# Patient Record
Sex: FEMALE | Race: WHITE | NOT HISPANIC OR LATINO | Employment: FULL TIME | URBAN - METROPOLITAN AREA
[De-identification: names, ages, dates, MRNs, and addresses within clinical notes are randomized per-mention and may not be internally consistent; named-entity substitution may affect disease eponyms.]

---

## 2017-01-12 ENCOUNTER — COMMUNICATION - HEALTHEAST (OUTPATIENT)
Dept: FAMILY MEDICINE | Facility: CLINIC | Age: 42
End: 2017-01-12

## 2017-01-19 ENCOUNTER — COMMUNICATION - HEALTHEAST (OUTPATIENT)
Dept: FAMILY MEDICINE | Facility: CLINIC | Age: 42
End: 2017-01-19

## 2017-03-05 ENCOUNTER — RECORDS - HEALTHEAST (OUTPATIENT)
Dept: ADMINISTRATIVE | Facility: OTHER | Age: 42
End: 2017-03-05

## 2017-03-27 ENCOUNTER — RECORDS - HEALTHEAST (OUTPATIENT)
Dept: ADMINISTRATIVE | Facility: OTHER | Age: 42
End: 2017-03-27

## 2017-05-11 ENCOUNTER — OFFICE VISIT - HEALTHEAST (OUTPATIENT)
Dept: PODIATRY | Facility: CLINIC | Age: 42
End: 2017-05-11

## 2017-05-11 DIAGNOSIS — L60.0 INGROWN TOENAIL: ICD-10-CM

## 2017-05-11 ASSESSMENT — MIFFLIN-ST. JEOR: SCORE: 1246.22

## 2017-05-24 ENCOUNTER — COMMUNICATION - HEALTHEAST (OUTPATIENT)
Dept: ADMINISTRATIVE | Facility: CLINIC | Age: 42
End: 2017-05-24

## 2017-05-25 ENCOUNTER — OFFICE VISIT - HEALTHEAST (OUTPATIENT)
Dept: PODIATRY | Facility: CLINIC | Age: 42
End: 2017-05-25

## 2017-05-25 DIAGNOSIS — L03.031 PARONYCHIA OF GREAT TOE OF RIGHT FOOT: ICD-10-CM

## 2017-05-25 DIAGNOSIS — D22.9: ICD-10-CM

## 2017-06-01 ENCOUNTER — OFFICE VISIT - HEALTHEAST (OUTPATIENT)
Dept: PODIATRY | Facility: CLINIC | Age: 42
End: 2017-06-01

## 2017-06-01 DIAGNOSIS — L03.031 PARONYCHIA OF GREAT TOE OF RIGHT FOOT: ICD-10-CM

## 2017-06-05 LAB
LAB AP CHARGES (HE HISTORICAL CONVERSION): NORMAL
PATH REPORT.COMMENTS IMP SPEC: NORMAL
PATH REPORT.FINAL DX SPEC: NORMAL
PATH REPORT.GROSS SPEC: NORMAL
PATH REPORT.MICROSCOPIC SPEC OTHER STN: NORMAL
PATH REPORT.RELEVANT HX SPEC: NORMAL
RESULT FLAG (HE HISTORICAL CONVERSION): NORMAL

## 2017-06-08 ENCOUNTER — OFFICE VISIT - HEALTHEAST (OUTPATIENT)
Dept: PODIATRY | Facility: CLINIC | Age: 42
End: 2017-06-08

## 2017-06-08 DIAGNOSIS — L03.031 PARONYCHIA OF GREAT TOE, RIGHT: ICD-10-CM

## 2017-06-19 ENCOUNTER — COMMUNICATION - HEALTHEAST (OUTPATIENT)
Dept: FAMILY MEDICINE | Facility: CLINIC | Age: 42
End: 2017-06-19

## 2017-06-19 DIAGNOSIS — Z00.00 ROUTINE GENERAL MEDICAL EXAMINATION AT A HEALTH CARE FACILITY: ICD-10-CM

## 2017-06-20 RX ORDER — SULFACETAMIDE SODIUM 100 MG/ML
LOTION TOPICAL
Qty: 118 ML | Refills: 12 | Status: SHIPPED | OUTPATIENT
Start: 2017-06-20

## 2017-09-14 ENCOUNTER — OFFICE VISIT - HEALTHEAST (OUTPATIENT)
Dept: FAMILY MEDICINE | Facility: CLINIC | Age: 42
End: 2017-09-14

## 2017-09-14 DIAGNOSIS — R53.83 FATIGUE: ICD-10-CM

## 2017-09-14 DIAGNOSIS — Z00.00 ROUTINE GENERAL MEDICAL EXAMINATION AT A HEALTH CARE FACILITY: ICD-10-CM

## 2017-09-14 LAB
CHOLEST SERPL-MCNC: 215 MG/DL
FASTING STATUS PATIENT QL REPORTED: YES
HDLC SERPL-MCNC: 67 MG/DL
LDLC SERPL CALC-MCNC: 136 MG/DL
TRIGL SERPL-MCNC: 60 MG/DL

## 2017-09-14 ASSESSMENT — MIFFLIN-ST. JEOR: SCORE: 1254.16

## 2017-09-15 ENCOUNTER — COMMUNICATION - HEALTHEAST (OUTPATIENT)
Dept: FAMILY MEDICINE | Facility: CLINIC | Age: 42
End: 2017-09-15

## 2018-01-11 ENCOUNTER — COMMUNICATION - HEALTHEAST (OUTPATIENT)
Dept: FAMILY MEDICINE | Facility: CLINIC | Age: 43
End: 2018-01-11

## 2018-03-16 ENCOUNTER — COMMUNICATION - HEALTHEAST (OUTPATIENT)
Dept: FAMILY MEDICINE | Facility: CLINIC | Age: 43
End: 2018-03-16

## 2018-03-20 ENCOUNTER — RECORDS - HEALTHEAST (OUTPATIENT)
Dept: ADMINISTRATIVE | Facility: OTHER | Age: 43
End: 2018-03-20

## 2018-07-19 ENCOUNTER — OFFICE VISIT - HEALTHEAST (OUTPATIENT)
Dept: FAMILY MEDICINE | Facility: CLINIC | Age: 43
End: 2018-07-19

## 2018-07-19 DIAGNOSIS — Z00.00 ROUTINE GENERAL MEDICAL EXAMINATION AT A HEALTH CARE FACILITY: ICD-10-CM

## 2018-07-19 DIAGNOSIS — J45.990 EXERCISE-INDUCED ASTHMA: ICD-10-CM

## 2018-07-19 DIAGNOSIS — E78.00 HYPERCHOLESTEREMIA: ICD-10-CM

## 2018-07-19 LAB
CHOLEST SERPL-MCNC: 230 MG/DL
FASTING STATUS PATIENT QL REPORTED: YES
HDLC SERPL-MCNC: 61 MG/DL
LDLC SERPL CALC-MCNC: 157 MG/DL
TRIGL SERPL-MCNC: 62 MG/DL

## 2018-07-19 ASSESSMENT — MIFFLIN-ST. JEOR: SCORE: 1255.29

## 2018-07-22 ENCOUNTER — OFFICE VISIT - HEALTHEAST (OUTPATIENT)
Dept: FAMILY MEDICINE | Facility: CLINIC | Age: 43
End: 2018-07-22

## 2018-07-22 DIAGNOSIS — B85.2 LICE: ICD-10-CM

## 2018-07-23 ENCOUNTER — COMMUNICATION - HEALTHEAST (OUTPATIENT)
Dept: FAMILY MEDICINE | Facility: CLINIC | Age: 43
End: 2018-07-23

## 2018-11-07 ENCOUNTER — COMMUNICATION - HEALTHEAST (OUTPATIENT)
Dept: FAMILY MEDICINE | Facility: CLINIC | Age: 43
End: 2018-11-07

## 2018-11-07 DIAGNOSIS — M25.519 SHOULDER PAIN: ICD-10-CM

## 2018-11-29 ENCOUNTER — OFFICE VISIT - HEALTHEAST (OUTPATIENT)
Dept: FAMILY MEDICINE | Facility: CLINIC | Age: 43
End: 2018-11-29

## 2018-11-29 ENCOUNTER — OFFICE VISIT - HEALTHEAST (OUTPATIENT)
Dept: PHYSICAL THERAPY | Facility: REHABILITATION | Age: 43
End: 2018-11-29

## 2018-11-29 DIAGNOSIS — M25.511 CHRONIC RIGHT SHOULDER PAIN: ICD-10-CM

## 2018-11-29 DIAGNOSIS — M62.81 GENERALIZED MUSCLE WEAKNESS: ICD-10-CM

## 2018-11-29 DIAGNOSIS — G89.29 CHRONIC RIGHT SHOULDER PAIN: ICD-10-CM

## 2018-11-29 DIAGNOSIS — Z01.818 PRE-OPERATIVE EXAMINATION: ICD-10-CM

## 2018-11-29 DIAGNOSIS — G25.89 SCAPULAR DYSKINESIS: ICD-10-CM

## 2018-11-29 DIAGNOSIS — E72.10 DISORDER OF SULFUR-BEARING AMINO ACID METABOLISM (H): ICD-10-CM

## 2018-11-29 DIAGNOSIS — R29.3 POOR POSTURE: ICD-10-CM

## 2018-11-29 LAB
HCG UR QL: NEGATIVE
HGB BLD-MCNC: 13.1 G/DL (ref 12–16)
SP GR UR STRIP: 1.01 (ref 1–1.03)

## 2018-11-29 ASSESSMENT — MIFFLIN-ST. JEOR: SCORE: 1255.29

## 2019-01-03 ENCOUNTER — OFFICE VISIT - HEALTHEAST (OUTPATIENT)
Dept: PHYSICAL THERAPY | Facility: REHABILITATION | Age: 44
End: 2019-01-03

## 2019-01-03 DIAGNOSIS — M62.81 GENERALIZED MUSCLE WEAKNESS: ICD-10-CM

## 2019-01-03 DIAGNOSIS — R29.3 POOR POSTURE: ICD-10-CM

## 2019-01-03 DIAGNOSIS — M25.511 CHRONIC RIGHT SHOULDER PAIN: ICD-10-CM

## 2019-01-03 DIAGNOSIS — G89.29 CHRONIC RIGHT SHOULDER PAIN: ICD-10-CM

## 2019-01-03 DIAGNOSIS — G25.89 SCAPULAR DYSKINESIS: ICD-10-CM

## 2019-02-14 ENCOUNTER — OFFICE VISIT - HEALTHEAST (OUTPATIENT)
Dept: PHYSICAL THERAPY | Facility: REHABILITATION | Age: 44
End: 2019-02-14

## 2019-02-14 DIAGNOSIS — R29.3 POOR POSTURE: ICD-10-CM

## 2019-02-14 DIAGNOSIS — M25.511 CHRONIC RIGHT SHOULDER PAIN: ICD-10-CM

## 2019-02-14 DIAGNOSIS — M62.81 GENERALIZED MUSCLE WEAKNESS: ICD-10-CM

## 2019-02-14 DIAGNOSIS — G25.89 SCAPULAR DYSKINESIS: ICD-10-CM

## 2019-02-14 DIAGNOSIS — G89.29 CHRONIC RIGHT SHOULDER PAIN: ICD-10-CM

## 2019-03-14 ENCOUNTER — OFFICE VISIT - HEALTHEAST (OUTPATIENT)
Dept: PHYSICAL THERAPY | Facility: REHABILITATION | Age: 44
End: 2019-03-14

## 2019-03-14 DIAGNOSIS — R29.3 POOR POSTURE: ICD-10-CM

## 2019-03-14 DIAGNOSIS — M62.81 GENERALIZED MUSCLE WEAKNESS: ICD-10-CM

## 2019-03-14 DIAGNOSIS — M25.511 CHRONIC RIGHT SHOULDER PAIN: ICD-10-CM

## 2019-03-14 DIAGNOSIS — G89.29 CHRONIC RIGHT SHOULDER PAIN: ICD-10-CM

## 2019-03-14 DIAGNOSIS — G25.89 SCAPULAR DYSKINESIS: ICD-10-CM

## 2019-03-26 ENCOUNTER — COMMUNICATION - HEALTHEAST (OUTPATIENT)
Dept: FAMILY MEDICINE | Facility: CLINIC | Age: 44
End: 2019-03-26

## 2019-04-01 ENCOUNTER — COMMUNICATION - HEALTHEAST (OUTPATIENT)
Dept: FAMILY MEDICINE | Facility: CLINIC | Age: 44
End: 2019-04-01

## 2019-05-02 ENCOUNTER — OFFICE VISIT - HEALTHEAST (OUTPATIENT)
Dept: FAMILY MEDICINE | Facility: CLINIC | Age: 44
End: 2019-05-02

## 2019-05-02 DIAGNOSIS — R10.13 ABDOMINAL DISCOMFORT, EPIGASTRIC: ICD-10-CM

## 2019-05-02 DIAGNOSIS — Z12.4 SCREENING FOR CERVICAL CANCER: ICD-10-CM

## 2019-05-02 DIAGNOSIS — R11.0 NAUSEA: ICD-10-CM

## 2019-05-02 LAB
ALBUMIN SERPL-MCNC: 4.3 G/DL (ref 3.5–5)
ALP SERPL-CCNC: 45 U/L (ref 45–120)
ALT SERPL W P-5'-P-CCNC: 20 U/L (ref 0–45)
AMYLASE SERPL-CCNC: 50 U/L (ref 5–120)
ANION GAP SERPL CALCULATED.3IONS-SCNC: 11 MMOL/L (ref 5–18)
AST SERPL W P-5'-P-CCNC: 27 U/L (ref 0–40)
BASOPHILS # BLD AUTO: 0 THOU/UL (ref 0–0.2)
BASOPHILS NFR BLD AUTO: 0 % (ref 0–2)
BILIRUB SERPL-MCNC: 0.3 MG/DL (ref 0–1)
BUN SERPL-MCNC: 17 MG/DL (ref 8–22)
CALCIUM SERPL-MCNC: 10 MG/DL (ref 8.5–10.5)
CHLORIDE BLD-SCNC: 106 MMOL/L (ref 98–107)
CO2 SERPL-SCNC: 23 MMOL/L (ref 22–31)
CREAT SERPL-MCNC: 0.86 MG/DL (ref 0.6–1.1)
EOSINOPHIL # BLD AUTO: 0.1 THOU/UL (ref 0–0.4)
EOSINOPHIL NFR BLD AUTO: 1 % (ref 0–6)
ERYTHROCYTE [DISTWIDTH] IN BLOOD BY AUTOMATED COUNT: 10.8 % (ref 11–14.5)
GFR SERPL CREATININE-BSD FRML MDRD: >60 ML/MIN/1.73M2
GLUCOSE BLD-MCNC: 103 MG/DL (ref 70–125)
HCT VFR BLD AUTO: 38.1 % (ref 35–47)
HGB BLD-MCNC: 12.8 G/DL (ref 12–16)
LIPASE SERPL-CCNC: 88 U/L (ref 0–52)
LYMPHOCYTES # BLD AUTO: 2.2 THOU/UL (ref 0.8–4.4)
LYMPHOCYTES NFR BLD AUTO: 35 % (ref 20–40)
MCH RBC QN AUTO: 29.7 PG (ref 27–34)
MCHC RBC AUTO-ENTMCNC: 33.7 G/DL (ref 32–36)
MCV RBC AUTO: 88 FL (ref 80–100)
MONOCYTES # BLD AUTO: 0.6 THOU/UL (ref 0–0.9)
MONOCYTES NFR BLD AUTO: 10 % (ref 2–10)
NEUTROPHILS # BLD AUTO: 3.3 THOU/UL (ref 2–7.7)
NEUTROPHILS NFR BLD AUTO: 53 % (ref 50–70)
PLATELET # BLD AUTO: 258 THOU/UL (ref 140–440)
PMV BLD AUTO: 8.5 FL (ref 7–10)
POTASSIUM BLD-SCNC: 4 MMOL/L (ref 3.5–5)
PROT SERPL-MCNC: 7.2 G/DL (ref 6–8)
RBC # BLD AUTO: 4.31 MILL/UL (ref 3.8–5.4)
SODIUM SERPL-SCNC: 140 MMOL/L (ref 136–145)
WBC: 6.2 THOU/UL (ref 4–11)

## 2019-05-02 ASSESSMENT — MIFFLIN-ST. JEOR: SCORE: 1264.37

## 2019-05-03 ENCOUNTER — AMBULATORY - HEALTHEAST (OUTPATIENT)
Dept: FAMILY MEDICINE | Facility: CLINIC | Age: 44
End: 2019-05-03

## 2019-05-03 DIAGNOSIS — R74.8 ELEVATED LIPASE: ICD-10-CM

## 2019-05-03 LAB
HPV SOURCE: NORMAL
HUMAN PAPILLOMA VIRUS 16 DNA: NEGATIVE
HUMAN PAPILLOMA VIRUS 18 DNA: NEGATIVE
HUMAN PAPILLOMA VIRUS FINAL DIAGNOSIS: NORMAL
HUMAN PAPILLOMA VIRUS OTHER HR: NEGATIVE
SPECIMEN DESCRIPTION: NORMAL

## 2019-05-09 ENCOUNTER — AMBULATORY - HEALTHEAST (OUTPATIENT)
Dept: LAB | Facility: CLINIC | Age: 44
End: 2019-05-09

## 2019-05-09 DIAGNOSIS — R74.8 ELEVATED LIPASE: ICD-10-CM

## 2019-05-09 LAB
ALBUMIN UR-MCNC: NEGATIVE MG/DL
APPEARANCE UR: CLEAR
BACTERIA #/AREA URNS HPF: ABNORMAL HPF
BILIRUB UR QL STRIP: NEGATIVE
BKR LAB AP ABNORMAL BLEEDING: NO
BKR LAB AP BIRTH CONTROL/HORMONES: NORMAL
BKR LAB AP CERVICAL APPEARANCE: NORMAL
BKR LAB AP GYN ADEQUACY: NORMAL
BKR LAB AP GYN INTERPRETATION: NORMAL
BKR LAB AP HPV REFLEX: NORMAL
BKR LAB AP LMP: NORMAL
BKR LAB AP PATIENT STATUS: NORMAL
BKR LAB AP PREVIOUS ABNORMAL: NORMAL
BKR LAB AP PREVIOUS NORMAL: 2013
COLOR UR AUTO: YELLOW
GLUCOSE UR STRIP-MCNC: NEGATIVE MG/DL
HGB UR QL STRIP: ABNORMAL
HIGH RISK?: NO
KETONES UR STRIP-MCNC: NEGATIVE MG/DL
LEUKOCYTE ESTERASE UR QL STRIP: NEGATIVE
LIPASE SERPL-CCNC: 62 U/L (ref 0–52)
NITRATE UR QL: NEGATIVE
PATH REPORT.COMMENTS IMP SPEC: NORMAL
PH UR STRIP: 5.5 [PH] (ref 5–8)
RBC #/AREA URNS AUTO: ABNORMAL HPF
RESULT FLAG (HE HISTORICAL CONVERSION): NORMAL
SP GR UR STRIP: <=1.005 (ref 1–1.03)
SQUAMOUS #/AREA URNS AUTO: ABNORMAL LPF
UROBILINOGEN UR STRIP-ACNC: ABNORMAL
WBC #/AREA URNS AUTO: ABNORMAL HPF

## 2019-05-10 LAB
H PYLORI AG STL QL IA: NORMAL
REPORT STATUS: NORMAL
SPECIMEN DESCRIPTION: NORMAL

## 2019-05-17 ENCOUNTER — COMMUNICATION - HEALTHEAST (OUTPATIENT)
Dept: FAMILY MEDICINE | Facility: CLINIC | Age: 44
End: 2019-05-17

## 2019-05-17 ENCOUNTER — AMBULATORY - HEALTHEAST (OUTPATIENT)
Dept: FAMILY MEDICINE | Facility: CLINIC | Age: 44
End: 2019-05-17

## 2019-05-17 DIAGNOSIS — R74.8 ELEVATED LIPASE: ICD-10-CM

## 2019-05-20 ENCOUNTER — AMBULATORY - HEALTHEAST (OUTPATIENT)
Dept: FAMILY MEDICINE | Facility: CLINIC | Age: 44
End: 2019-05-20

## 2019-05-20 DIAGNOSIS — R74.8 ELEVATED LIPASE: ICD-10-CM

## 2019-05-23 ENCOUNTER — COMMUNICATION - HEALTHEAST (OUTPATIENT)
Dept: FAMILY MEDICINE | Facility: CLINIC | Age: 44
End: 2019-05-23

## 2019-05-23 ENCOUNTER — HOSPITAL ENCOUNTER (OUTPATIENT)
Dept: CT IMAGING | Facility: HOSPITAL | Age: 44
Discharge: HOME | End: 2019-05-23
Attending: FAMILY MEDICINE

## 2019-05-23 DIAGNOSIS — R74.8 ELEVATED LIPASE: ICD-10-CM

## 2019-06-20 ENCOUNTER — RECORDS - HEALTHEAST (OUTPATIENT)
Dept: ADMINISTRATIVE | Facility: OTHER | Age: 44
End: 2019-06-20

## 2019-09-12 ENCOUNTER — RECORDS - HEALTHEAST (OUTPATIENT)
Dept: ADMINISTRATIVE | Facility: OTHER | Age: 44
End: 2019-09-12

## 2019-10-24 ENCOUNTER — RECORDS - HEALTHEAST (OUTPATIENT)
Dept: ADMINISTRATIVE | Facility: OTHER | Age: 44
End: 2019-10-24

## 2019-10-29 ENCOUNTER — MEDICAL CORRESPONDENCE (OUTPATIENT)
Dept: HEALTH INFORMATION MANAGEMENT | Facility: CLINIC | Age: 44
End: 2019-10-29

## 2019-10-29 ENCOUNTER — TRANSFERRED RECORDS (OUTPATIENT)
Dept: HEALTH INFORMATION MANAGEMENT | Facility: CLINIC | Age: 44
End: 2019-10-29

## 2019-11-14 ENCOUNTER — COMMUNICATION - HEALTHEAST (OUTPATIENT)
Dept: FAMILY MEDICINE | Facility: CLINIC | Age: 44
End: 2019-11-14

## 2019-11-14 ENCOUNTER — OFFICE VISIT - HEALTHEAST (OUTPATIENT)
Dept: FAMILY MEDICINE | Facility: CLINIC | Age: 44
End: 2019-11-14

## 2019-11-14 DIAGNOSIS — R68.2 DRY MOUTH: ICD-10-CM

## 2019-11-14 DIAGNOSIS — I83.93 ASYMPTOMATIC VARICOSE VEINS OF BOTH LOWER EXTREMITIES: ICD-10-CM

## 2019-11-14 DIAGNOSIS — R74.8 ELEVATED LIPASE: ICD-10-CM

## 2019-11-14 DIAGNOSIS — Z00.00 HEALTHY ADULT ON ROUTINE PHYSICAL EXAMINATION: ICD-10-CM

## 2019-11-14 DIAGNOSIS — D36.13 NEUROMA OF FOOT: ICD-10-CM

## 2019-11-14 DIAGNOSIS — Z00.00 ROUTINE GENERAL MEDICAL EXAMINATION AT A HEALTH CARE FACILITY: ICD-10-CM

## 2019-11-14 DIAGNOSIS — H04.123 DRY EYES: ICD-10-CM

## 2019-11-14 LAB
C REACTIVE PROTEIN LHE: <0.1 MG/DL (ref 0–0.8)
ERYTHROCYTE [SEDIMENTATION RATE] IN BLOOD BY WESTERGREN METHOD: 7 MM/HR (ref 0–20)
LIPASE SERPL-CCNC: 56 U/L (ref 0–52)

## 2019-11-14 RX ORDER — LIFITEGRAST 50 MG/ML
SOLUTION/ DROPS OPHTHALMIC
Status: SHIPPED | COMMUNITY
Start: 2019-11-13

## 2019-11-14 ASSESSMENT — MIFFLIN-ST. JEOR: SCORE: 1269.47

## 2019-11-22 ENCOUNTER — COMMUNICATION - HEALTHEAST (OUTPATIENT)
Dept: FAMILY MEDICINE | Facility: CLINIC | Age: 44
End: 2019-11-22

## 2020-01-09 ENCOUNTER — COMMUNICATION - HEALTHEAST (OUTPATIENT)
Dept: FAMILY MEDICINE | Facility: CLINIC | Age: 45
End: 2020-01-09

## 2020-01-09 DIAGNOSIS — J45.990 EXERCISE-INDUCED ASTHMA: ICD-10-CM

## 2020-01-13 RX ORDER — ALBUTEROL SULFATE 90 UG/1
AEROSOL, METERED RESPIRATORY (INHALATION)
Qty: 18 G | Refills: 3 | Status: SHIPPED | OUTPATIENT
Start: 2020-01-13 | End: 2021-09-16

## 2021-05-27 NOTE — TELEPHONE ENCOUNTER
New Appointment Needed  What is the reason for the visit:    patient says she feels sick to her stomach  Provider Preference: PCP only  How soon do you need to be seen?: next week, patient only has availability on Thursdays  Waitlist offered?: No  Okay to leave a detailed message:  Yes

## 2021-05-27 NOTE — TELEPHONE ENCOUNTER
Called pt and relayed PCP is out all week and would need to ask PCP if can fit in on Thursday.  Suggested [facility] at [facility].  Pt wasn't too keen due to the wait time.  Checked out both [facility] and [facility] to be seen.  Pt to keep checking.  Pt has been nauseated x 2 weeks, no vomiting or diarrhea.  Thanks.

## 2021-05-27 NOTE — TELEPHONE ENCOUNTER
Called patient and left voicemail.  If patient calls back please schedule for asthma and general appointment with Dr. Joseph.

## 2021-05-28 NOTE — TELEPHONE ENCOUNTER
Patient Returning Call  Reason for call:  Caller returning call to check on the status of this request.  Information relayed to patient:  Pending providers fedback.  Patient has additional questions:  yes  If YES, what are your questions/concerns:  Please return call to patient soon she stated the abdominal pain is not worse but about the same.  Patient was advised Ayala Joseph MD is not in office today or tomorrow.   Okay to leave a detailed message?: Yes

## 2021-05-28 NOTE — PROGRESS NOTES
"S:  45 yo female who is here with complaints of abdominal discomfort, and some mild bloating that has been going on for 1 month.  It is intermittent.  It is only in her upper abdomen.    She is also seeing an acupuncturist, and was given a new formula, this has since been changed back to the original formula, and her nausea is improved.    She is on supplements through her acupuncture provider.    She feels more constipated than normal.  No changes in her bladder function.  No black or bloody stools. She still has a BM nearly daily.  She feels like her weight has gone up.  She is running a lot and is working out a lot.  Her clothes are fitting her mostly the same. No early satiety.  No increase in belching.    She has ibuprofen once weekly before playing volleyball.  She has more nausea after she has more tea or ibuprofen.      fam hx:  Of colon cancer in her aunt and her father has had polyps.      Meds:  Chaste berry plus by [employer]        IC3+ by nutridyn    Soc hx:  She has tried a lot of different diet strategies.   She has been trying intermittent fasting.  She will have tea with protein in the middle of the day.      O:  /62   Pulse 62   Temp 98.4  F (36.9  C) (Oral)   Resp 16   Ht 5' 6.5\" (1.689 m)   Wt 130 lb (59 kg)   LMP 04/06/2019 (Approximate)   SpO2 98%   BMI 20.67 kg/m    Gen: no acute distress  Neck:  Supple, no lad, no thyromegaly or nodules.   Heart:  Regular rate and rhythm.  No m/r/g  Lungs: cta bilaterally, no wheezes or rhonchi.  Good air inspiration  Abdomen:  No masses or organomegaly, soft.  Non tender.  Non distended.  Normal bowel sounds.  No rebound or guarding.   Extremities:  No edema.     Genitourinary Exam:   Vulva: normal skin.  No lesions noted.  Nontender.    Urethral meatus: normal size and location, no lesions or discharge   Urethra: no tenderness or masses   Bladder: no fullness or tenderness   Vagina: normal appearance, physiologic discharge. No evidence of " cystocele or rectocele.   Cervix: normal appearance, no lesions, no cervical motion tenderness   Uterus: normal size and position, mobile, non-tender   Pap smear obtained: yes  Rectal exam: deferred             Patient Active Problem List   Diagnosis     Chronic Tension-type Headache     Dry Eye Syndrome     Fatigue     Varicose Veins     Irregular Length Of Menstrual Periods     Cough     Methylenetetrahydrofolate Reductase Deficiency     Acne     Melasma     Shoulder pain     Current Outpatient Medications on File Prior to Visit   Medication Sig Dispense Refill     albuterol (PROAIR HFA;PROVENTIL HFA;VENTOLIN HFA) 90 mcg/actuation inhaler Inhale 2 puffs every 6 (six) hours as needed for wheezing. 18 g 11     aspirin 81 MG EC tablet Take 1 tablet (81 mg total) by mouth daily.  0     b complex vitamins tablet Take 1 tablet by mouth daily.       chlorpheniramine (CHLOR-TRIMETON) 4 mg tablet Take 1 mg by mouth every 6 (six) hours as needed for allergies.       ivermectin (STROMECTOL) 3 mg Tab Take 4 tablets orally every 10 days for 2 doses. 8 tablet 0     MAGNESIUM CARBONATE ORAL Take 1 capsule by mouth daily.       PRASTERONE, DHEA, (DHEA ORAL) Take 1 capsule by mouth daily.       sulfacetamide sodium 10 % Susp Apply  Topically twice daily 118 mL 12     turmeric root extract 500 mg cap Take 1 capsule by mouth daily.       UNABLE TO FIND Take 2 capsules by mouth daily. Med Name: adren-all        UNABLE TO FIND Take 1 capsule by mouth at bedtime. Med Name: Thyromin made by [employer]       UNABLE TO FIND Med Name: progesterone/testosterone 100/0.5mg  Dissove 1/16 sublingual  6-13 of month take 1/16 daily; 14-28 of month take 1/16th of tab bid.       UNABLE TO FIND Take 1 tablet by mouth 2 (two) times a day. Med Name: Chasteberry Plus       UNABLE TO FIND Take 1 capsule by mouth 2 (two) times a day. Med Name: Nutridyn I-3-C Plus       UNABLE TO FIND Take 3 drops by mouth once. Med Name: K&B by [employer]       No current facility-administered medications on file prior to visit.           No results found for this or any previous visit (from the past 48 hour(s)).      Assessment/Plan:  1. Screening for cervical cancer    - Gynecologic Cytology (PAP Smear)    2. Abdominal discomfort, epigastric  Likely due to gastritis.  Will start using ranitidine twice daily.  We discussed diet changes.  If her symptoms persist or worsen she is to let me know, and at that point some imaging and an endoscopy would be warrante.d      - Urinalysis-UC if Indicated  - Comprehensive Metabolic Panel  - HM1(CBC and Differential)  - Lipase  - Amylase  - H. pylori Antigen, Stool(HPSAG)  - ranitidine (ZANTAC) 150 MG tablet; Take 1 tablet (150 mg total) by mouth 2 (two) times a day.  Dispense: 60 tablet; Refill: 1  - HM1 (CBC with Diff)    3. Nausea    - Urinalysis-UC if Indicated  - Comprehensive Metabolic Panel  - HM1(CBC and Differential)  - Lipase  - Amylase  - H. pylori Antigen, Stool(HPSAG)  - ranitidine (ZANTAC) 150 MG tablet; Take 1 tablet (150 mg total) by mouth 2 (two) times a day.  Dispense: 60 tablet; Refill: 1  - HM1 (CBC with Diff)          Ayala Joseph   5/2/2019 3:37 PM

## 2021-05-28 NOTE — TELEPHONE ENCOUNTER
Patient Returning Call  Reason for call:  The patient is returning the call.  Information relayed to patient:  Below message has been relayed to the patient.  The patient states that her nausea is better, the stomach bloating is not better. The patient states she does not have pain unless she presses on her stomach.       Notes recorded by Ayala Joseph MD on 5/17/2019 at 3:18 PM CDT  I called and left a message with the patient regarding these results.  I did ask her to give us a call back so that we could see how she was doing.  If she is has not having worsening abdominal pain then she will need a CT scan of her abdomen.  I have put in a referral to GI as well.       Patient has additional questions:  No  If YES, what are your questions/concerns:  NA  Okay to leave a detailed message?: No

## 2021-05-29 NOTE — TELEPHONE ENCOUNTER
Called pt and relayed covering provider's message.  Understood. Pt has apt with [facility] TOVAR and gave numbers for both  and [facility] for CT.  Thanks.

## 2021-05-29 NOTE — TELEPHONE ENCOUNTER
Please call the patient and let her know that I put in a referral for a CT scan of her abdomen as this is what Dr. Joseph had planned as the next step in evaluation.  Dr. Joseph has also put in a referral to [facility].  Please give the patient the phone number for radiology so she can call and make an appointment for the CT scan.

## 2021-05-30 VITALS — HEIGHT: 67 IN | WEIGHT: 126 LBS | BODY MASS INDEX: 19.78 KG/M2

## 2021-05-31 ENCOUNTER — RECORDS - HEALTHEAST (OUTPATIENT)
Dept: ADMINISTRATIVE | Facility: CLINIC | Age: 46
End: 2021-05-31

## 2021-05-31 VITALS — BODY MASS INDEX: 20.05 KG/M2 | WEIGHT: 127.75 LBS | HEIGHT: 67 IN

## 2021-06-01 VITALS — WEIGHT: 130.5 LBS | BODY MASS INDEX: 20.75 KG/M2

## 2021-06-01 VITALS — HEIGHT: 67 IN | BODY MASS INDEX: 20.09 KG/M2 | WEIGHT: 128 LBS

## 2021-06-02 VITALS — WEIGHT: 128 LBS | BODY MASS INDEX: 20.09 KG/M2 | HEIGHT: 67 IN

## 2021-06-03 VITALS — WEIGHT: 130 LBS | HEIGHT: 67 IN | BODY MASS INDEX: 20.4 KG/M2

## 2021-06-03 VITALS
TEMPERATURE: 97.7 F | HEIGHT: 66 IN | SYSTOLIC BLOOD PRESSURE: 82 MMHG | WEIGHT: 132 LBS | RESPIRATION RATE: 16 BRPM | OXYGEN SATURATION: 95 % | DIASTOLIC BLOOD PRESSURE: 58 MMHG | BODY MASS INDEX: 21.21 KG/M2 | HEART RATE: 53 BPM

## 2021-06-05 NOTE — TELEPHONE ENCOUNTER
Refill Approved    Rx renewed per Medication Renewal Policy. Medication was last renewed on 7/19/18 #18g R-11.    Last OV 11/14/19    Sowmya Rausch, [facility] Triage/Med Refill 1/13/2020     Requested Prescriptions   Pending Prescriptions Disp Refills     VENTOLIN HFA 90 mcg/actuation inhaler [Pharmacy Med Name: Ventolin HFA Inhalation Aerosol Solution 108 (90 Base) MCG/ACT] 18 g 10     Sig: INHALE TWO PUFFS BY MOUTH EVERY SIX HOURS AS NEEDED FOR WHEEZING       Albuterol/Levalbuterol Refill Protocol Passed - 1/9/2020 11:44 AM        Passed - PCP or prescribing provider visit in last year     Last office visit with prescriber/PCP: 5/2/2019 Ayala Joseph MD OR same dept: 5/2/2019 Ayala Joseph MD OR same specialty: 5/2/2019 Ayala Joseph MD Last physical: 11/14/2019       Next appt within 3 mo: Visit date not found  Next physical within 3 mo: Visit date not found  Prescriber OR PCP: Ayala Joseph MD  Last diagnosis associated with med order: 1. Exercise-induced asthma  - VENTOLIN HFA 90 mcg/actuation inhaler [Pharmacy Med Name: Ventolin HFA Inhalation Aerosol Solution 108 (90 Base) MCG/ACT]; INHALE TWO PUFFS BY MOUTH EVERY SIX HOURS AS NEEDED FOR WHEEZING  Dispense: 18 g; Refill: 10    If protocol passes may refill for 6 months if within 3 months of last provider visit (or a total of 9 months). If patient requesting >1 inhaler per month refill x 6 months and have patient make appointment with provider.

## 2021-06-10 NOTE — PROGRESS NOTES
Subjective findings: The patient return to the clinic once again today complaining of a painful right great toenail.  She has had some drainage redness and swelling along the out side portion of the right great toenail.    Objective findings: : Nails bilateral feet are normal length and color.  There is a slight erythema and edema at the level of the eponychiuml of the right great toenail.   Skin bilateral feet warm supple and intact.  DP and PT pulses +2/4 bilateral feet. Capillary refill less than 2 seconds bilateral feet.  Negative clonus, negative Babinski bilateral feet.  Range of motion within normal limits bilateral feet. Muscle power +5/5 bilaterally in all compartments.      Assessment: Paronychia right great toe, unknown mass nailbed right great toe      Plan: I performed a total nail evulsion of the right great toenail today under local anesthesia of 2% lidocaine plain.  Once the nail had been removed and area of the pigmentation was noted and intraoperative decision was made to biopsy this lesion.  Utilizing a 15 blade to semi-elliptical incisions were made encompassing this area of increased pigmentation.  This portion of the nail bed was placed in formalin to be sent to pathology.  The nailbed was repaired with 4-0 nylon suture material.  A sterile dressing was comprising of bacitracin, Telfa, 2 inch Ramiro and tube gauze.  The patient tolerated the procedure and anesthesia well and was discharged in good condition.  She was placed on Tylenol 3 1 tab every 4-6 hours as needed pain the patient was placed on clindamycin 300 mg 1 tab 3 times daily.  I have asked the patient to return to the clinic in 1 week  for follow-up visit.

## 2021-06-10 NOTE — PROGRESS NOTES
----- Message from Jina Petersen MD sent at 2/8/2021 12:15 PM CST -----  Labs look fairly normal as far as STD screen.  White count was elevated, like she may have been coming down with some infection, any symptoms?  Can follow up to discuss.   Admission History & Physical  Danya Brooks, 1975, [id number]    [facility]  Ayala Pao Joseph MD, [phone]    No emergency contact information on file.     Assessment and Plan:   Assessment: Onychauxis left great toenail  Plan: The patient is to monitor her condition.  If she develops redness, swelling, drainage or bleeding the patient is to return to the clinic for follow-up visit.  Active Problems:    * No active hospital problems. *      Chief Complaint:  infected left great toenail      HPI:    Danya Brooks is a 42 y.o. old female who presented to the clinic today complaining of an infected left great toenail.  She stated that she has been soaking and applying antibiotic and the infection has significantly improved.  She started to cancel her appointment but she decided to keep the appointment to have her toe evaluated.    History is provided by patient    Medical History  Active Ambulatory (Non-Hospital) Problems    Diagnosis     Shoulder pain     Acne     Melasma     Chronic Tension-type Headache     Dry Eye Syndrome     Fatigue     Varicose Veins     Irregular Length Of Menstrual Periods     Cough     Methylenetetrahydrofolate Reductase Deficiency     History reviewed. No pertinent past medical history.  Patient Active Problem List    Diagnosis Date Noted     Shoulder pain 01/12/2016     Acne      Melasma      Chronic Tension-type Headache      Dry Eye Syndrome      Fatigue      Varicose Veins      Irregular Length Of Menstrual Periods      Cough      Methylenetetrahydrofolate Reductase Deficiency      Surgical History  She  has a past surgical history that includes ligatn short saphen.   Past Surgical History:   Procedure Laterality Date     ID LIGATMICHELLE SHORT SAPHEN      Description: Venous Ligation Short Saphenous Vein;  Recorded: 10/04/2012;    Social History  Reviewed, and she  reports that she has never smoked. She does not have any smokeless tobacco history on file.  Social  "History   Substance Use Topics     Smoking status: Never Smoker     Smokeless tobacco: Not on file     Alcohol use Not on file      Allergies  Allergies   Allergen Reactions     Amoxicillin Hives     Doxycycline Hyclate Hives     Minocycline Hives    Family History  Reviewed, and family history includes Colon polyps in her father; Hyperlipidemia in her father; Ovarian cancer in her sister; Thyroid disease in her mother.   Psychosocial Needs  Social History     Social History Narrative     Additional psychosocial needs reviewed per nursing assessment.       Prior to Admission Medications     (Not in a hospital admission)        Review of Systems - Negative     /78  Pulse (!) 53  Ht 5' 6.5\" (1.689 m)  Wt 126 lb (57.2 kg)  SpO2 99%  BMI 20.03 kg/m2    Objective findings: General: The patient is alert and in no acute distress      Integument: Nails bilateral feet are normal length and color.  There is a slight discoloration of the left great toenail along the lateral border.  Skin bilateral feet warm supple and intact.      Vascular: DP and PT pulses +2/4 bilateral feet. Capillary refill less than 2 seconds bilateral feet.      Neurologic: Negative clonus, negative Babinski bilateral feet.      Musculoskeletal: Range of motion within normal limits bilateral feet. Muscle power +5/5 bilaterally in all compartments.      Assessment: onychauxis      Plan: I have recommended the patient continue to monitor her condition.  No treatment is needed at this particular time.   "

## 2021-06-11 NOTE — PROGRESS NOTES
Subjective findings: The patient return to the clinic today for postop visit #1, 1 week status post total nail evulsion with excision and biopsy of the nailbed right great toe.  The patient stated she had very little discomfort.  She was able to wear normal shoes this week.  She had minimal bleeding.    Objective findings: The dressings were removed and the wound margins are well coaptated and maintained.  There is no edema, erythema, cellulitis, drainage or bleeding noted.  Neurovascular status is intact.  Vital signs are stable.    Assessment: Paronychia right great toe.    Plan: I informed the patient that I have not received a report from the pathologist regarding the biopsy.  A Band-Aid was applied to the right great toe.  The patient is to return to the clinic in 1 week for postop visit #2 at which time sutures will be removed.

## 2021-06-11 NOTE — PROGRESS NOTES
Subjective findings: The patient return to the clinic today for postop visit #2, 2 weeks status post total nail evulsion with excision and biopsy of the nailbed right great toe.  The patient stated she had very little discomfort.      Objective findings: The dressings were removed and the wound margins are well healed.   There is no edema, erythema, cellulitis, drainage or bleeding noted.  Neurovascular status is intact.      Assessment: Paronychia right great toe.     Plan: I removed all sutures.  The patient was given a prescription for clindamycin 300 mg 1 tab 3 times daily.  The patient is to return to the clinic as needed.

## 2021-06-12 ENCOUNTER — HEALTH MAINTENANCE LETTER (OUTPATIENT)
Age: 46
End: 2021-06-12

## 2021-06-12 NOTE — PROGRESS NOTES
"Assessment:      Healthy female exam.    on oral testosterone:  Check lft's and lipids.  Advised that testosterone can increase the risk of blood clots, specifically dvt's.  I recommend that she discontinue oral testosterone.    Orders Placed This Encounter   Procedures     Lipid Cascade     Comprehensive Metabolic Panel     Thyroid Cascade     Plan for a pap smear next year.    She did have some questions about intermittent GI upset.  As well as her fatigue.  I did let her know that she could try a complete elimination diet as she noted that some foods do not seem to agree with her.  I reviewed how to do this.  I did give her some information regarding a full elimination diet such as one that is used to diagnose eosinophilic esophagitis.       Subjective:      Danya Brooks is a 42 y.o. female who presents for an annual exam. The patient is sexually active. The patient participates in regular exercise: yes. The patient reports that there is not domestic violence in her life.     She saw a nurse practitioner due to fatigue.  She had her hormone levels checked, and was found that her levels were low.  She was told that her thyroid was low according to  levels.  She was also told that she was in \"adrenal fatigue\" and was put on some vitamins, and on DHEA.  Her sleep and fatigue are improved.    She continues to be forgetful if she doesn't write stuff down or if her routine is nor normal.  She was placed on progesterone and oral testosterone, as well as some thyroid vitamins and some adrenal vitamins.  She does not have these results with her at this time.    Her menstrual cycles are 30-35 days.    [facility] in [place].    She is taking thyramin.  It is from [employer].      Healthy Habits:   Regular Exercise: Yes  Sunscreen Use: Yes  Healthy Diet: Yes  Dental Visits Regularly: Yes  Seat Belt: Yes  Sexually active: Yes  Self Breast Exam Monthly:not monthly, but does some  Hemoccults: N/A  Flex Sig: " N/A  Colonoscopy: N/A  Lipid Profile: Yes  Glucose Screen: Yes  Prevention of Osteoporosis: Yes  Last Dexa: N/A  Guns at Home:  No      Immunization History   Administered Date(s) Administered     Hep A, historic 12/10/2009     Influenza [id number], 11/06/2009     Influenza, inj, historic 10/30/2008     Influenza, nasal, historic 10/14/2015     Influenza,seasonal quad, PF, 36+MOS 12/19/2016     Tdap 11/04/2010, 08/22/2012     Immunization status: up to date and documented.    No exam data present    Gynecologic History  Patient's last menstrual period was 08/17/2017.  Contraception: none  Last Pap: 7/25/13. Results were: normal  Last mammogram: not done yet. Results were: n/a      OB History   No data available       Current Outpatient Prescriptions   Medication Sig Dispense Refill     aspirin 81 MG EC tablet Take 1 tablet (81 mg total) by mouth daily.  0     b complex vitamins tablet Take 1 tablet by mouth daily.       chlorpheniramine (CHLOR-TRIMETON) 4 mg tablet Take 1 mg by mouth every 6 (six) hours as needed for allergies.       MAGNESIUM CARBONATE ORAL Take 1 capsule by mouth daily.       PRASTERONE, DHEA, (DHEA ORAL) Take 1 capsule by mouth daily.       turmeric root extract 500 mg cap Take 1 capsule by mouth daily.       UNABLE TO FIND Take 4 capsules by mouth daily. Med Name: adren-all       UNABLE TO FIND Take 1 capsule by mouth at bedtime. Med Name: Thyromin made by [employer]       UNABLE TO FIND Med Name: progesterone/testosterone 100/0.5mg  Dissove 1/16 sublingual  6-13 of month take 1/16 daily; 14-28 of month take 1/16th of tab bid.       sulfacetamide sodium 10 % Susp Apply  Topically twice daily 118 mL 12     No current facility-administered medications for this visit.      No past medical history on file.  Past Surgical History:   Procedure Laterality Date     MT LIGATN SHORT SAPHEN      Description: Venous Ligation Short Saphenous Vein;  Recorded: 10/04/2012;     Amoxicillin;  "Doxycycline hyclate; and Minocycline  Family History   Problem Relation Age of Onset     Colon polyps Father      Hyperlipidemia Father      Ovarian cancer Sister      pre cancerous tumor.  recommended complete hysterectomy     Thyroid disease Mother      Social History     Social History     Marital status:      Spouse name: N/A     Number of children: N/A     Years of education: N/A     Occupational History     Not on file.     Social History Main Topics     Smoking status: Never Smoker     Smokeless tobacco: Never Used     Alcohol use Yes      Comment: rare     Drug use: No     Sexual activity: Yes     Partners: Male     Birth control/ protection: None     Other Topics Concern     Not on file     Social History Narrative       Review of Systems  General:  Denies problem  Eyes: Denies problem  Ears/Nose/Throat: Denies problem  Cardiovascular: Denies problem  Respiratory:  Denies problem  Gastrointestinal:  Denies problem, Genitourinary: Denies problem  Musculoskeletal:  Denies problem  Skin: Denies problem  Neurologic: Denies problem  Psychiatric: Denies problem  Endocrine: Denies problem  Heme/Lymphatic: Denies problem   Allergic/Immunologic: Denies problem        Objective:         Vitals:    09/14/17 0924   BP: 90/66   Pulse: (!) 58   Resp: 16   Temp: 98.1  F (36.7  C)   TempSrc: Oral   SpO2: 99%   Weight: 127 lb 12 oz (57.9 kg)   Height: 5' 6.5\" (1.689 m)     Body mass index is 20.31 kg/(m^2).    Physical Exam:  General Appearance: Alert, cooperative, no distress, appears stated age  Head: Normocephalic, without obvious abnormality, atraumatic  Eyes: PERRL, conjunctiva/corneas clear, EOM's intact  Ears: Normal TM's and external ear canals, both ears  Nose: Nares normal, septum midline,mucosa normal, no drainage  Throat: Lips, mucosa, and tongue normal; teeth and gums normal  Neck: Supple, symmetrical, trachea midline, no adenopathy;  thyroid: not enlarged, symmetric, no tenderness/mass/nodules; no " carotid bruit or JVD  Back: Symmetric, no curvature, ROM normal, no CVA tenderness  Lungs: Clear to auscultation bilaterally, respirations unlabored  Breasts: No breast masses, tenderness, asymmetry, or nipple discharge.  Heart: Regular rate and rhythm, S1 and S2 normal, no murmur, rub, or gallop, Abdomen: Soft, non-tender, bowel sounds active all four quadrants,  no masses, no organomegaly  Pelvic:Normally developed genitalia with no external lesions or eruptions. Vagina and cervix show no lesions, inflammation, discharge or tenderness. No cystocele, No rectocele. Uterus midline.  No adnexal mass or tenderness.      Extremities: Extremities normal, atraumatic, no cyanosis or edema  Skin: melasma noted over her face.    Lymph nodes: Cervical, supraclavicular, and axillary nodes normal  Neurologic: Normal

## 2021-06-16 PROBLEM — R74.8 ELEVATED LIPASE: Status: ACTIVE | Noted: 2019-05-03

## 2021-06-17 NOTE — PATIENT INSTRUCTIONS - HE
Patient Instructions by Ayala Joseph MD at 5/2/2019  3:30 PM     Author: Ayala Joseph MD Service: -- Author Type: Physician    Filed: 5/2/2019  4:06 PM Encounter Date: 5/2/2019 Status: Signed    : Ayala Joseph MD (Physician)       Patient Education     Gastritis (Adult)    Gastritis is inflammation and irritation of the stomach lining. It can be present for a short time (acute) or be long lasting (chronic). Gastritis is often caused by infection with bacteria called H pylori. More than a third of people in the  have this bacteria in their bodies. In many cases, H pylori causes no problems or symptoms. In some people, though, the infection irritates the stomach lining and causes gastritis. Other causes of stomach irritation include drinking alcohol or taking pain-relieving medicines called NSAIDs (such as aspirin or ibuprofen).   Symptoms of gastritis can include:    Abdominal pain or bloating    Loss of appetite    Nausea or vomiting    Vomiting blood or having black stools    Feeling more tired than usual  An inflamed and irritated stomach lining is more likely to develop a sore called an ulcer. To help prevent this, gastritis should be treated.  Home care  If needed, medicines may be prescribed. If you have H pylori infection, treating it will likely relieve your symptoms. Other changes can help reduce stomach irritation and help it heal.    If you have been prescribed medicines for H pylori infection, take them as directed. Take all of the medicine until it is finished or your healthcare provider tells you to stop, even if you feel better.    Your healthcare provider may recommend avoiding NSAIDs. If you take daily aspirin for your heart or other medical reasons, do not stop without talking to your healthcare provider first.    Avoid drinking alcohol.    Stop smoking. Smoking can irritate the stomach and delay healing. As much as possible, stay away from second hand  smoke.  Follow-up care  Follow up with your healthcare provider, or as advised by our staff. Testing may be needed to check for inflammation or an ulcer.  When to seek medical advice  Call your healthcare provider for any of the following:    Stomach pain that gets worse or moves to the lower right abdomen (appendix area)    Chest pain that appears or gets worse, or spreads to the back, neck, shoulder, or arm    Frequent vomiting (cant keep down liquids)    Blood in the stool or vomit (red or black in color)    Feeling weak or dizzy    Fever of 100.4 F (38 C) or higher, or as directed by your healthcare provider  Date Last Reviewed: 6/22/2015 2000-2017 [employer]. [address] [place] [address], [place] [address]. All rights reserved. This information is not intended as a substitute for professional medical care. Always follow your healthcare professional's instructions.

## 2021-06-19 NOTE — LETTER
Letter by Ayala Joseph MD at      Author: Ayala Joseph MD Service: -- Author Type: --    Filed:  Encounter Date: 5/23/2019 Status: (Other)               [address]   [place] [address]   PHONE [phone]  [phone]     May 23, 2019  Danyadoretha Brooks  [address] [place] [id number]    Dear Danya:    Below are the results from your recent visit.  Your results are listed below.  Your H pylori test was negative.      Resulted Orders   Urinalysis-UC if Indicated   Result Value Ref Range    Color, UA Yellow Colorless, Yellow, Straw, Light Yellow    Clarity, UA Clear Clear    Glucose, UA Negative Negative    Bilirubin, UA Negative Negative    Ketones, UA Negative Negative    Specific Gravity, UA <=1.005 1.005 - 1.030    Blood, UA Trace (!) Negative    pH, UA 5.5 5.0 - 8.0    Protein, UA Negative Negative mg/dL    Urobilinogen, UA 0.2 E.U./dL 0.2 E.U./dL, 1.0 E.U./dL    Nitrite, UA Negative Negative    Leukocytes, UA Negative Negative    Bacteria, UA None Seen None Seen hpf    RBC, UA 0-2 None Seen, 0-2 hpf    WBC, UA None Seen None Seen, 0-5 hpf    Squam Epithel, UA 0-5 None Seen, 0-5 lpf    Narrative    UC not indicated   Comprehensive Metabolic Panel   Result Value Ref Range    Sodium 140 136 - 145 mmol/L    Potassium 4.0 3.5 - 5.0 mmol/L    Chloride 106 98 - 107 mmol/L    CO2 23 22 - 31 mmol/L    Anion Gap, Calculation 11 5 - 18 mmol/L    Glucose 103 70 - 125 mg/dL    BUN 17 8 - 22 mg/dL    Creatinine 0.86 0.60 - 1.10 mg/dL    GFR MDRD Af Amer >60 >60 mL/min/1.73m2    GFR MDRD Non Af Amer >60 >60 mL/min/1.73m2    Bilirubin, Total 0.3 0.0 - 1.0 mg/dL    Calcium 10.0 8.5 - 10.5 mg/dL    Protein, Total 7.2 6.0 - 8.0 g/dL    Albumin 4.3 3.5 - 5.0 g/dL    Alkaline Phosphatase 45 45 - 120 U/L    AST 27 0 - 40 U/L    ALT 20 0 - 45 U/L    Narrative    Fasting Glucose reference range is 70-99 mg/dL per  American Diabetes Association (ADA) guidelines.    Lipase   Result Value Ref Range    Lipase 88 (H) 0 - 52 U/L   Amylase   Result Value Ref Range    Amylase 50 5 - 120 U/L   Gynecologic Cytology (PAP Smear)   Result Value Ref Range    Case Report       Gynecologic Cytology Report                       Case: [id number]                                   Authorizing Provider:  Ayala Joseph MD   Collected:           05/02/2019 1623              Ordering Location:     [facility]            Received:            05/02/2019 1628                                     Medicine/OB                                                                  First Screen:          Rosalinda Tsang, [place]                                                                            (ASCP)                                                                       Specimen:    SUREPATH PAP, SCREENING, Endocervical/cervical                                             Interpretation  Negative for squamous intraepithelial lesion or malignancy.      Negative for squamous intraepithelial lesion or malignancy    Result Flag Normal Normal    Specimen Adequacy       Satisfactory for evaluation, endocervical/transformation zone component present    HPV Reflex? Yes regardless of result     HIGH RISK No     LMP/Menopause Date last month     Abnormal Bleeding No     Pt Status n/a     Birth Control/Hormones None     Previous Normal/Date 2013     Prev Abn Date/Dx none     Cervical Appearance Normal    H. pylori Antigen, Stool(HPSAG)   Result Value Ref Range    Specimen Description Feces     H pylori Antigen SEE NOTES       Comment:      H pylori Antigen         Negative for Helicobacter pylori antigen                            by enzyme immunoassay. A negative                           result indicates the absence of H.                            pylori antigen or that the level of antigen                           is below the level of detection.    Report Status FINAL 05/10/2019       Comment:       Testing lab location: [facility], [place], [address], [place] [address], [place] [id number],[facility] [id number] [phone]  Testing lab location: [facility], [address] [place] [address], [address] [address], [place] [id number],[facility] [id number] [phone]    Performed and/or entered by:  [facility]  [address] [place] [address]   Performed and/or entered by:  [facility]  [address] [place] [address]   HM1 (CBC with Diff)   Result Value Ref Range    WBC 6.2 4.0 - 11.0 thou/uL    RBC 4.31 3.80 - 5.40 mill/uL    Hemoglobin 12.8 12.0 - 16.0 g/dL    Hematocrit 38.1 35.0 - 47.0 %    MCV 88 80 - 100 fL    MCH 29.7 27.0 - 34.0 pg    MCHC 33.7 32.0 - 36.0 g/dL    RDW 10.8 (L) 11.0 - 14.5 %    Platelets 258 140 - 440 thou/uL    MPV 8.5 7.0 - 10.0 fL    Neutrophils % 53 50 - 70 %    Lymphocytes % 35 20 - 40 %    Monocytes % 10 2 - 10 %    Eosinophils % 1 0 - 6 %    Basophils % 0 0 - 2 %    Neutrophils Absolute 3.3 2.0 - 7.7 thou/uL    Lymphocytes Absolute 2.2 0.8 - 4.4 thou/uL    Monocytes Absolute 0.6 0.0 - 0.9 thou/uL    Eosinophils Absolute 0.1 0.0 - 0.4 thou/uL    Basophils Absolute 0.0 0.0 - 0.2 thou/uL   HPV High Risk DNA Cervical   Result Value Ref Range    HPV Source SurePath     HPV16 DNA Negative NEG    HPV18 DNA Negative NEG    Other HR HPV Negative NEG    Final Diagnosis SEE NOTES       Comment:      This patient's sample is negative for HPV DNA.  This test was developed and its performance characteristics determined by the  [facility], [facility]. It  has not been cleared or approved by the FDA. The laboratory is regulated under  CLIA as qualified to perform high-complexity testing. This test is used for  clinical purposes. It should not be regarded as investigational or  for  research.  (Note)  METHODOLOGY:  The Roche evita 4800 system uses automated extraction,  simultaneous amplification of HPV (L1 region) and beta-globin,  followed by  real time detection of fluorescent labeled HPV and beta  globin using specific oligonucleotide probes . The test specifically  identifies types HPV 16 DNA and HPV 18 DNA while concurrently  detecting the rest of the high risk types (31, 33, 35, 39, 45, 51,  52, 56, 58, 59, 66 or 68).    COMMENTS:  This test is not intended for use as a screening device  for women under age 30 with normal cervical   cytology.  Results should  be correlated with cytologic and histologic findings. Close clinical  followup is recommended.        Specimen Description Cervical Cells       Comment:      Performed and/or entered by:  [facility] [address] [address]          If you have any questions or concerns, please do not hesitate to call.    Sincerely,      Ayala Joseph MD  May 23, 2019

## 2021-06-19 NOTE — LETTER
Letter by Ayala Joseph MD at      Author: Ayala Joseph MD Service: -- Author Type: --    Filed:  Encounter Date: 11/14/2019 Status: Signed               [address]  [place] [address]   PHONE [phone]  [phone]     November 14, 2019  Danya Brooks  [address] [place] [address]    Dear Danya:    Below are the results from your recent visit.  Your results are negative for inflammation ,but show a continued elevated lipase level.  So I think we will know from here, that this is where you run for now for your lipase.      Resulted Orders   C-Reactive Protein   Result Value Ref Range    CRP <0.1 0.0 - 0.8 mg/dL   Erythrocyte Sedimentation Rate   Result Value Ref Range    Sed Rate 7 0 - 20 mm/hr   Lipase   Result Value Ref Range    Lipase 56 (H) 0 - 52 U/L         If you have any questions or concerns, please do not hesitate to call.    Sincerely,      Ayala Joseph MD  November 14, 2019

## 2021-06-19 NOTE — PROGRESS NOTES
FEMALE PREVENTATIVE EXAM    Assessment and Plan:       1. Hypercholesteremia  I did encourage her to pursue much more of a plant-based diet rather than one that includes a lot of meat.  I will contact her with any abnormal results.  - Lipid Cascade        3. Routine general medical examination at a health care facility  Unable to do a Pap smear today due to menses.  She will follow-up in 1 year for repeat Pap.  We did review her very low risk status.  Continue with exercise and healthy diet.  I did ask her to please figure out what diagnosis of ovarian cancer her sister had.  I also asked her to further seek out the history of her brother and sister having colonoscopies and with her diagnoses were at that time.  This will help us to determine the need for further screening at this time.  Tinea with baby aspirin for MTHFR gene positive.    4. Exercise-induced asthma    - albuterol (PROAIR HFA;PROVENTIL HFA;VENTOLIN HFA) 90 mcg/actuation inhaler; Inhale 2 puffs every 6 (six) hours as needed for wheezing.  Dispense: 18 g; Refill: 11     Next follow up:  No Follow-up on file.    Immunization Review  Adult Imm Review: No immunizations due today      I discussed the following with the patient:   Adult Healthy Living: Importance of regular exercise  Healthy nutrition  Getting adequate sleep  Stress management  Use of seat belts        Subjective:   Chief Complaint: Danya Brooks is an 43 y.o. female here for a preventative health visit.     HPI:  She is doing well.  She has taken grains out of her diet and feels good with this.  She does eat more meat now.  She is off her testosterone supplements.  She otherwise feels good.  She denies any significant aches and pains except for some mild joint pains.  She has had these since she was a child.      Her sister has been diagnosed with cervical cancer.  She is not sure what type of cervical cancer this is.  She did undergo a full hysterectomy and oophorectomy.  She did not have  "any sort of chemotherapy.    Healthy Habits  Are you taking a daily aspirin? Yes  Do you typically exercising at least 40 min, 3-4 times per week?  Yes  Do you usually eat at least 4 servings of fruit and vegetables a day, include whole grains and fiber and avoid regularly eating high fat foods? Yes  Have you had an eye exam in the past two years? Yes  Do you see a dentist twice per year? Yes  Do you have any concerns regarding sleep? No    Safety Screen  If you own firearms, are they secured in a locked gun cabinet or with trigger locks? Yes  Do you feel you are safe where you are living?: Yes (7/19/2018  8:35 AM)  Do you feel you are safe in your relationship(s)?: Yes (7/19/2018  8:35 AM)    Review of Systems:  Please see above.  The rest of the review of systems are negative for all systems.     Pap History:   Last 3 PAP results:  No results found for: PAP  Cancer Screening       Status Date      PAP SMEAR Next Due 7/25/2018      Done 7/25/2013 GYNECOLOGIC CYTOLOGY (PAP SMEAR)              History     Reviewed By Date/Time Sections Reviewed    Elisa Gordon CMA 7/19/2018  8:35 AM Tobacco            Objective:   Vital Signs:   Visit Vitals     BP 92/60     Pulse (!) 52     Temp 97.8  F (36.6  C) (Oral)     Resp 14     Ht 5' 6.5\" (1.689 m)     Wt 128 lb (58.1 kg)     LMP 07/19/2018 (Exact Date)     Breastfeeding No     BMI 20.35 kg/m2          PHYSICAL EXAM  General Appearance: Alert, cooperative, no distress, appears stated age  Head: Normocephalic, without obvious abnormality, atraumatic  Eyes: PERRL, conjunctiva/corneas clear, EOM's intact  Ears: Normal TM's and external ear canals, both ears  Nose: Nares normal, septum midline,mucosa normal, no drainage  Throat: Lips, mucosa, and tongue normal; teeth and gums normal  Neck: Supple, symmetrical, trachea midline, no adenopathy;  thyroid: not enlarged, symmetric, no tenderness/mass/nodules; no carotid bruit or JVD  Back: Symmetric, no curvature, ROM normal, " no CVA tenderness  Lungs: Clear to auscultation bilaterally, respirations unlabored  Breasts: No breast masses, tenderness, asymmetry, or nipple discharge.  Heart: Regular rate and rhythm, S1 and S2 normal, no murmur, rub, or gallop,   Abdomen: Soft, non-tender, bowel sounds active all four quadrants,  no masses, no organomegaly, obese  Extremities: Extremities normal, atraumatic, no cyanosis or edema  Skin: Skin color, texture, turgor normal, no rashes or lesions.   noted.   Lymph nodes: Cervical, supraclavicular, and axillary nodes normal  Neurologic: Normal       The 10-year ASCVD risk score (Blythe KIERRA Jr, et al., 2013) is: 0.3%    Values used to calculate the score:      Age: 43 years      Sex: Female      Is Non- : No      Diabetic: No      Tobacco smoker: No      Systolic Blood Pressure: 92 mmHg      Is BP treated: No      HDL Cholesterol: 67 mg/dL      Total Cholesterol: 215 mg/dL         Medication List          These changes are accurate as of 7/19/18  6:13 PM.  If you have any questions, ask your nurse or doctor.               START taking these medications          albuterol 90 mcg/actuation inhaler   Also known as:  PROAIR HFA;PROVENTIL HFA;VENTOLIN HFA   INSTRUCTIONS:  Inhale 2 puffs every 6 (six) hours as needed for wheezing.   Doctor's comments:  May substitute Proair HFA, Ventolin HFA, albuterol HFA, Xopenex HFA   Started by:  Ayala Joseph MD             CONTINUE taking these medications          aspirin 81 MG EC tablet   INSTRUCTIONS:  Take 1 tablet (81 mg total) by mouth daily.           b complex vitamins tablet   INSTRUCTIONS:  Take 1 tablet by mouth daily.           chlorpheniramine 4 mg tablet   Also known as:  CHLOR-TRIMETON   INSTRUCTIONS:  Take 1 mg by mouth every 6 (six) hours as needed for allergies.           DHEA ORAL   INSTRUCTIONS:  Take 1 capsule by mouth daily.           MAGNESIUM CARBONATE ORAL   INSTRUCTIONS:  Take 1 capsule by mouth daily.            sulfacetamide sodium 10 % Susp   INSTRUCTIONS:  Apply  Topically twice daily           turmeric root extract 500 mg Cap   INSTRUCTIONS:  Take 1 capsule by mouth daily.           UNABLE TO FIND   INSTRUCTIONS:  Take 2 capsules by mouth daily. Med Name: adren-all           UNABLE TO FIND   INSTRUCTIONS:  Take 1 capsule by mouth at bedtime. Med Name: Thyromin made by [employer]           UNABLE TO FIND   INSTRUCTIONS:  Med Name: progesterone/testosterone 100/0.5mg Dissove 1/16 sublingual  6-13 of month take 1/16 daily; 14-28 of month take 1/16th of tab bid.           UNABLE TO FIND   INSTRUCTIONS:  Take 1 tablet by mouth 2 (two) times a day. Med Name: Chasteberry Plus           UNABLE TO FIND   INSTRUCTIONS:  Take 1 capsule by mouth 2 (two) times a day. Med Name: Nutridyn I-3-C Plus           UNABLE TO FIND   INSTRUCTIONS:  Take 3 drops by mouth once. Med Name: K&B by [employer]                Where to Get Your Medications      These medications were sent to [id number] - [place] - [address]  [address] [facility] [address]     Phone:  [phone]      albuterol 90 mcg/actuation inhaler             Additional Screenings Completed Today:

## 2021-06-22 NOTE — PROGRESS NOTES
Preoperative Exam    Scheduled Procedure: Fat Transfer   Surgery Date:  12/21/2018  Surgery Location: [facility] in [place]    Surgeon:  Dr. Monae Hraper     Assessment/Plan:     1. Methylenetetrahydrofolate Reductase Deficiency  Continue with baby aspirin    2. Pre-operative examination  Reviewed risks and benefits.    - Hemoglobin  - Pregnancy (Beta-hCG, Qual), Urine     Surgical Procedure Risk: Low (reported cardiac risk generally < 1%)  Have you had prior anesthesia?: Yes  Have you or any family members had a previous anesthesia reaction:  No  Do you or any family members have a history of a clotting or bleeding disorder?: Yes: known MTHFR  Cardiac Risk Assessment: no increased risk for major cardiac complications    Patient approved for surgery with general or local anesthesia.  Needs SCD's during surgery and recovery.  Needs compression stockings during surgery and after.    Will continue with low dose aspirin.    If any complications that require prolonged immobility, consider direct acting oral anticoagulant.      Please Note:  patient will remain on aspirin.      Functional Status: Independent  Patient plans to recover at home with family.     Subjective:      Danya Brooks is a 43 y.o. female who presents for a preoperative consultation.  She is going to have fat transfer to her breasts.    She has had her wisdom teeth out, and used anesthesia for that.  She will be going to work the am of the procedure, and has questions about eating and drinking prior to the procedure.      No personal history of clots.      All other systems reviewed and are negative, other than those listed in the HPI.    Pertinent History  Do you have difficulty breathing or chest pain after walking up a flight of stairs: No  History of obstructive sleep apnea: No  Steroid use in the last 6 months: No  Frequent Aspirin/NSAID use: Yes: n/a  Prior Blood Transfusion: No  Prior Blood Transfusion Reaction: No  If for some reason prior  to, during or after the procedure, if it is medically indicated, would you be willing to have a blood transfusion?:  There is no transfusion refusal.   Her sister had difficult with needing a lot of anesthesia, then had difficulty waking up from it.        Current Outpatient Medications   Medication Sig Dispense Refill     albuterol (PROAIR HFA;PROVENTIL HFA;VENTOLIN HFA) 90 mcg/actuation inhaler Inhale 2 puffs every 6 (six) hours as needed for wheezing. 18 g 11     aspirin 81 MG EC tablet Take 1 tablet (81 mg total) by mouth daily.  0     b complex vitamins tablet Take 1 tablet by mouth daily.       chlorpheniramine (CHLOR-TRIMETON) 4 mg tablet Take 1 mg by mouth every 6 (six) hours as needed for allergies.       ivermectin (STROMECTOL) 3 mg Tab Take 4 tablets orally every 10 days for 2 doses. 8 tablet 0     MAGNESIUM CARBONATE ORAL Take 1 capsule by mouth daily.       PRASTERONE, DHEA, (DHEA ORAL) Take 1 capsule by mouth daily.       sulfacetamide sodium 10 % Susp Apply  Topically twice daily 118 mL 12     turmeric root extract 500 mg cap Take 1 capsule by mouth daily.       UNABLE TO FIND Take 2 capsules by mouth daily. Med Name: adren-all        UNABLE TO FIND Take 1 capsule by mouth at bedtime. Med Name: Thyromin made by [employer]       UNABLE TO FIND Med Name: progesterone/testosterone 100/0.5mg  Dissove 1/16 sublingual  6-13 of month take 1/16 daily; 14-28 of month take 1/16th of tab bid.       UNABLE TO FIND Take 1 tablet by mouth 2 (two) times a day. Med Name: Chasteberry Plus       UNABLE TO FIND Take 1 capsule by mouth 2 (two) times a day. Med Name: Nutridyn I-3-C Plus       UNABLE TO FIND Take 3 drops by mouth once. Med Name: K&B by [employer]       No current facility-administered medications for this visit.         Allergies   Allergen Reactions     Amoxicillin Hives     Doxycycline Hyclate Hives     Minocycline Hives       Patient Active Problem List   Diagnosis     Chronic  "Tension-type Headache     Dry Eye Syndrome     Fatigue     Varicose Veins     Irregular Length Of Menstrual Periods     Cough     Methylenetetrahydrofolate Reductase Deficiency     Acne     Melasma     Shoulder pain       No past medical history on file.    Past Surgical History:   Procedure Laterality Date     DE LIGATN SHORT SAPHEN      Description: Venous Ligation Short Saphenous Vein;  Recorded: 10/04/2012;       Social History     Socioeconomic History     Marital status:      Spouse name: Not on file     Number of children: Not on file     Years of education: Not on file     Highest education level: Not on file   Social Needs     Financial resource strain: Not on file     Food insecurity - worry: Not on file     Food insecurity - inability: Not on file     Transportation needs - medical: Not on file     Transportation needs - non-medical: Not on file   Occupational History     Not on file   Tobacco Use     Smoking status: Never Smoker     Smokeless tobacco: Never Used   Substance and Sexual Activity     Alcohol use: Yes     Comment: rare     Drug use: No     Sexual activity: Yes     Partners: Male     Birth control/protection: None   Other Topics Concern     Not on file   Social History Narrative     Not on file             Objective:     Vitals:    11/29/18 1047   BP: 106/60   Pulse: 60   Resp: 16   Temp: 97.6  F (36.4  C)   TempSrc: Oral   SpO2: 99%   Weight: 128 lb (58.1 kg)   Height: 5' 6.5\" (1.689 m)   LMP: 10/27/2018         Physical Exam:  General Appearance: Alert, cooperative, no distress, appears stated age  Head: Normocephalic, without obvious abnormality, atraumatic  Eyes: PERRL, conjunctiva/corneas clear, EOM's intact  Ears: Normal TM's and external ear canals, both ears  Nose: Nares normal, septum midline,mucosa normal, no drainage  Throat: Lips, mucosa, and tongue normal; teeth and gums normal  Neck: Supple, symmetrical, trachea midline, no adenopathy;  thyroid: not enlarged, symmetric, " no tenderness/mass/nodules; no carotid bruit or JVD  Back: Symmetric, no curvature, ROM normal, no CVA tenderness  Lungs: Clear to auscultation bilaterally, respirations unlabored  Heart: Regular rate and rhythm, S1 and S2 normal, no murmur, rub, or gallop,   Abdomen: Soft, non-tender, bowel sounds active all four quadrants,  no masses, no organomegaly, obese  Extremities: Extremities normal, atraumatic, no cyanosis or edema  Skin: Skin color, texture, turgor normal, no rashes or lesions.   noted.   Lymph nodes: Cervical, supraclavicular, and axillary nodes normal  Neurologic: Normal         There are no Patient Instructions on file for this visit.        Labs:  hgb today 13.1  Upt. Negative.      Immunization History   Administered Date(s) Administered     Hep A, historic 12/10/2009     Influenza I4d3-23, 11/06/2009     Influenza, inj, historic,unspecified 10/30/2008, 10/13/2017     Influenza,live, Nasal Laiv4 10/14/2015     Influenza,seasonal quad, PF, 36+MOS 12/19/2016     Tdap 11/04/2010, 08/22/2012           Electronically signed by Ayala Joseph MD 11/29/18 10:50 AM

## 2021-06-22 NOTE — PROGRESS NOTES
[facility]   Shoulder Initial Evaluation    Patient Name: Danya Brooks  Date of evaluation: 11/29/2018  Referral Diagnosis: Shoulder pain   Referring provider: Ayala Joseph MD  Visit Diagnosis:     ICD-10-CM    1. Chronic right shoulder pain M25.511     G89.29    2. Scapular dyskinesis G25.89    3. Generalized muscle weakness M62.81    4. Poor posture R29.3        Assessment:     Danya Brooks is a 43 y.o. female who presents to therapy today with chief complaints of chronic R sided shoulder pain. Pt was previously seen in PT 2 years ago with good relief, however pain has fluctuated over the last year with recent increase in pain approximately one month ago. Pain is located in anterior and posterior R shoulder - aggravated with pushing/pulling, lifting, volleyball and HIIT exercise class. Occasional clicking/popping/catching. Evaluation today reveals: R shoulder weakness, scapular weakness and dyskinesia, +labral testing, + RC testing. S/s appear consistent with likely chronic shoulder labral tearing with rotator cuff weakness and tendonitis. Patient will benefit from 1:1 skilled physical therapy services to address the above limitations.         Goals:  Pt. will demonstrate/verbalize independence in self-management of condition in : 2 weeks  Pt. will be independent with home exercise program in : 2 weeks    Pt will: hit volleyball recreationally (not competition) without increased R shoulder pain to return to volleyball practice in 8 weeks  Pt will: return to HIIT class without increased R shoulder pain following to exercise in 8 weeks  Pt will: return to lifting/carrying groceries/laundry without increased shoulder pain to improve ADL's in 8 weeks  Pt will: return to reaching overhead into cupboard without increased R shoulder pain to improve work function in 8 weeks      Patient's expectations/goals are realistic.    Barriers to Learning or Achieving Goals:  No Barriers.       Plan / Patient  Instructions:        Plan of Care:   Authorization / Certification Start Date: 11/29/18  Authorization / Certification Number of Visits: up to 8-10 visits   Communication with: Referral Source  Patient Related Instruction: Nature of Condition;Body mechanics;Treatment plan and rationale;Basis of treatment;Posture;Precautions;Next steps  Times per Week: 1x/week  Number of Weeks: up to 12 weeks  Number of Visits: up to 8-10 visits   Discharge Planning: to independent HEP  Therapeutic Exercise: ROM;Stretching;Strengthening  Neuromuscular Reeducation: kinesio tape;posture;balance/proprioception;TNE;postural restoration;core  Manual Therapy: soft tissue mobilization;myofascial release;muscle energy;joint mobilization  Other Plan #1: therapeutic activity       POC and pathology of condition were reviewed with patient.  Pt. is in agreement with the Plan of Care  A Home Exercise Program (HEP) was initiated today.  Pt. was instructed in exercises by PT and patient was given a handout with detailed instructions.  Plan for next visit: cont strengthening scap mm progress HEP  .   Subjective:        Danya Brooks is a 42 y/o female who presents today with chief c/o chronic R sided shoulder pain. Pt was previously seen in PT 2 years ago with good relief, however pain has fluctuated over the last year with recent increase in pain approximately one month ago. Pain is sometimes diffuse, sometimes sharp. Located posterior>anterior shoulder. Massaging helps, acupuncture helps temporarily. Pain overall has been improving for the last 2 weeks. She hasn't played volleyball in 6 months but will be returning to [employer] ball in January. Pulling, pushing, carrying objects, and playing volley ball increase pain. HIIT class also increases her pain, ex: push ups.   Occasionally pain with sleeping.No longer has pain at rest. She does experience click, pop, catch with overhead/rounding movements     Social information:   Living Situation:single  family home   Occupation:optometrist     Pain Ratin  Pain rating at best: 0  Pain rating at worst: 4  Pain description: aching and sharp    Patient reports benefit from:  rest  , movement or exercise        Objective:      Note: Items left blank indicates the item was not performed or not indicated at the time of the evaluation.    Patient Outcome Measures :    Shoulder Pain and Disability Index (SPADI) in %: 29.2     Scores range from 0-100%, where a score of 0% represents minimal pain and maximal function. The minimal clincically important difference is a score reduction of 10%.    Shoulder Examination  1. Chronic right shoulder pain     2. Scapular dyskinesis     3. Generalized muscle weakness     4. Poor posture         Precautions/Restrictions: None  Involved side: Right    Posture Observation:   Standing: stands with R scapular depression, B scapular winging   Sitting:     Cervical Clearing:    Shoulder/Elbow ROM    Date:      Shoulder and Elbow ROM ( )   AROM in degrees AROM in degrees AROM in degrees    Right Left Right Left Right Left   Shoulder Flexion (0-180 ) 155 pain at end range 170       Shoulder Abduction (0-180 ) No pain, rightness pesen        Shoulder Extension (0-60 )         Shoulder ER (0-90 ) WNL no pain WNL no pain       Shoulder IR (0-70 ) Stuck, to T10  T3       Shoulder IR/Ext         Elbow Flexion (150 )         Elbow Extension (0 )          PROM in degrees PROM in degrees PROM in degrees    Right Left Right Left Right Left   Shoulder Flexion (0-180 )         Shoulder Abduction (0-180 )         Shoulder Extension (0-60 )         Shoulder ER (0-90 )         Shoulder IR (0-70 )         Elbow Flexion (150 )         Elbow Extension (0 )             Scapular mechanics:     Early UR R scap, then delayed UR   Clicking with abduction       Shoulder/Elbow Strength   Date:      Shoulder/Elbow Strength (/5)  Manual Muscle Test (MMT) MMT MMT MMT    Right Left Right Left Right Left   Shoulder  Abduction 4 5       Supraspinatus         Infraspinatus         Shoulder Flexion 4 5       Shoulder Extension         Shoulder External Rotation Slight pain 4+ 5       Shoulder Internal Rotation 4+ slight pain  5       Elbow Flexion         Elbow Extension         Other:         Other:           Joint Assessment:  Glenohumeral Joint Assessment:  Inferior glide:  Posterior glide:decreased   Anterior glide: increased movement/laxity       Flexibility:     Shoulder Special Tests     Impingement Cluster Right (+/-) Left (+/-) Rotator Cuff Tests Right (+/-) Left (+/-)   Andrew-El +  Drop Arm Sign     Painful Arc   Hornblowers     Neer's Impingement  +  ERLS     Sandhya's +  IRLS     Infraspinatus Test/ER isometric         AC Tests Right (+/-) Left (+/-) Labral Tests Right (+/-) Left (+/-)   Active Compression +  Crank test +    Crossbody Adduction   Jerk Test     AC Resisted Extension   Misty Test     GH Instability Tests Right (+/-) Left (+/-) Biceps Tests Right (+/-) Left (+/-)   Sulcus Sign   Speed     Apprehension   Dot s     Relocation   Other:     Other:   Other:     Other:          Palpation:   Tenderness R AC joint  Pain posterior R infraspinatus             Treatment Today    TREATMENT MINUTES COMMENTS   Evaluation 28 Shoulder evaluation    Self-care/ Home management     Manual therapy 12 Posterior glide R shoulder grade III   Posterior glide R shoulder at 90-90 with oscillations     Neuromuscular Re-education     Therapeutic Activity     Therapeutic Exercises 10 Exercises initiated today:  Exercise #1: Prone I  Comment #1: HEP x 5 sec hold x 10-15 reps  Exercise #2: Sidelying ER  Comment #2: HEP 2# 2 x 20-30 reps, cues for R shoulder position  Exercise #3: Serratus wall clock   Comment #3: HEP L1 band 3-4 reps on both UE        Gait training     Modality__________________                Total 50    Blank areas are intentional and mean the treatment did not include these items.           PT Evaluation  Code: (Please list factors)  Patient History/Comorbidities: shoulder pain, tension HA  Examination: see above  Clinical Presentation: stable  Clinical Decision Making: low    Patient History/  Comorbidities Examination  (body structures and functions, activity limitations, and/or participation restrictions) Clinical Presentation Clinical Decision Making (Complexity)   No documented Comorbidities or personal factors 1-2 Elements Stable and/or uncomplicated Low   1-2 documented comorbidities or personal factor 3 Elements Evolving clinical presentation with changing characteristics Moderate   3-4 documented comorbidities or personal factors 4 or more Unstable and unpredictable High           Crystal Aleman  11/29/2018  2:03 PM

## 2021-06-22 NOTE — PROGRESS NOTES
[facility] Daily Progress     Patient Name: Danya Brooks  Date: 1/3/2019  Visit #: -  PTA visit #:    Referral Diagnosis: R shoulder pain  Referring provider: Ayala Joseph MD  Visit Diagnosis:     ICD-10-CM    1. Chronic right shoulder pain M25.511     G89.29    2. Scapular dyskinesis G25.89    3. Generalized muscle weakness M62.81    4. Poor posture R29.3          Assessment:   Patient returns to PT with improving shoulder pain. She recently had a surgery which does not allow her to exercise, lay prone for an additional 5 weeks. Plan to FU with patient after restrictions have been lifted.     Patient is benefitting from skilled physical therapy and is making steady progress toward functional goals.  Patient is appropriate to continue with skilled physical therapy intervention, as indicated by initial plan of care.    Goal Status:  Pt. will demonstrate/verbalize independence in self-management of condition in : 2 weeks  Pt. will be independent with home exercise program in : 2 weeks    Pt will: hit volleyball recreationally (not competition) without increased R shoulder pain to return to volleyball practice in 8 weeks  Pt will: return to HIIT class without increased R shoulder pain following to exercise in 8 weeks  Pt will: return to lifting/carrying groceries/laundry without increased shoulder pain to improve ADL's in 8 weeks  Pt will: return to reaching overhead into cupboard without increased R shoulder pain to improve work function in 8 weeks      Plan / Patient Education:     Continue with initial plan of care.  Progress with home program as tolerated.    Subjective:   Patient reports she recently had a  surgery and has not been able to lift, exercise, lay prone, etc. This will continue for 4 weeks-5 weeks after today. She has been performing HEP exercises prior to this.   She has noticed her shoulder is feeling better today- less pain overall. Could also be due to no longer  exercising/lifting with surgery precautions.     Objective:    improved flexion- WFL, although slightly restricted at end range compared to L hypermobile side   Restrictions in R SC joint inferior and posterior movements with flexion and horiz add     Treatment Today     TREATMENT MINUTES COMMENTS   Evaluation     Self-care/ Home management     Manual therapy 11   Grade III  posterior glide R clavicle at R SC joint, added MWM into horizontal adduction with therapist performing glide x5 reps x 2 sets.    Grade III inferior glide R clavicle at SC joint. MWM into flexion with PT performing glide x 5 reps x  2 sets.      Neuromuscular Re-education     Therapeutic Activity     Therapeutic Exercises 24 Exercises:  Exercise #1: Prone I- handouts given for T's and Y's- not attempted, pt unable to lay prone   Comment #1: HEP x 5 sec hold x 10-15 reps  Exercise #2: Sidelying ER  Comment #2: HEP 2# 2 x 20-30 reps, cues for R shoulder position  Exercise #3: Serratus wall clock   Comment #3: HEP L2 band 3-4 reps on both UE - progressed and performed today  Exercise #4: Ball on wall   Comment #4: HEP x 20-30 reps C/CC  Exercise #5: Serratus wall slide  Comment #5: Reviewed from previous HEP L1 band with serratus activation     Discussion of progress, HEP and POC.    Gait training     Modality__________________                Total 35    Blank areas are intentional and mean the treatment did not include these items.       Crystal Aleman  1/3/2019

## 2021-06-25 NOTE — PROGRESS NOTES
[facility] Discharge Summary  Patient Name: Danya Brooks  Date: 5/15/2019  Referral Diagnosis: Shoulder pain   Referring provider: Ayala Joseph MD  Visit Diagnosis:   1. Chronic right shoulder pain     2. Scapular dyskinesis     3. Generalized muscle weakness     4. Poor posture       Goals:  Pt. will demonstrate/verbalize independence in self-management of condition in : 2 weeks (MET)  Pt. will be independent with home exercise program in : 2 weeks (MET)     Pt will: hit volleyball recreationally (not competition) without increased R shoulder pain to return to volleyball practice in 8 weeks (MET)  Pt will: return to HIIT class without increased R shoulder pain following to exercise in 8 weeks (MET, but avoids pushing/pulling)  Pt will: return to lifting/carrying groceries/laundry without increased shoulder pain to improve ADL's in 8 weeks (MET)  Pt will: return to reaching overhead into cupboard without increased R shoulder pain to improve work function in 8 weeks (MET)    Patient was seen for 4 visits from  to 3/14/19 with 0 missed appointments.  The patient met goals and has demonstrated understanding of and independence in the home program for self-care, and progression to next steps.  She will initiate contact if questions or concerns arise.    Therapy will be discontinued at this time.  The patient will need a new referral to resume.    Thank you for your referral.  Crystal Aleman  5/15/2019  12:23 PM          [facility] Daily Progress     Patient Name: Danya Brooks  Date: 3/14/2019  Visit #: 4/6-8  PTA visit #:    Referral Diagnosis: R shoulder pain  Referring provider: Ayala Joseph MD  Visit Diagnosis:     ICD-10-CM    1. Chronic right shoulder pain M25.511     G89.29    2. Scapular dyskinesis G25.89    3. Generalized muscle weakness M62.81    4. Poor posture R29.3          Assessment:   Patient has met goals today's date. She presents today with B SC joint and  clavicle restrictions, which improved post treatment. She has been responding well to manual interventions and was able to return to volleyball and HIIT without increased Sx. She continues to avoid pushing/pulling with R arm due to pain, which is likely because of anterior translation of R shoulder and overall instability. Pt is happy with her progress at this point and is appropriate to DC. Plan to hold chart open 30-60 days before formal DC.     Goals:  Pt. will demonstrate/verbalize independence in self-management of condition in : 2 weeks (MET)  Pt. will be independent with home exercise program in : 2 weeks (MET)     Pt will: hit volleyball recreationally (not competition) without increased R shoulder pain to return to volleyball practice in 8 weeks (MET)  Pt will: return to HIIT class without increased R shoulder pain following to exercise in 8 weeks (MET, but avoids pushing/pulling)  Pt will: return to lifting/carrying groceries/laundry without increased shoulder pain to improve ADL's in 8 weeks (MET)  Pt will: return to reaching overhead into cupboard without increased R shoulder pain to improve work function in 8 weeks (MET)     Plan / Patient Education:     Hold chart open 45 days before formal DC from PT.     Subjective:   Patient has been able to return to volleyball without increased pain.   She has been avoiding push ups at HIIT but is able to perform most other ex's without pain.     Objective:    Improved flexion- WFL, although slightly restricted at end range compared to L hypermobile side - true again today's date, pt tends to flex also with abduction on R side only.     Restrictions in R SC joint inferior and posterior movements with flexion and horiz add. L inferior glide>R inferior glide and R posterior glide>L posterior glide.     Treatment Today     TREATMENT MINUTES COMMENTS   Evaluation     Self-care/ Home management     Manual therapy 30 Performed bilaterally today:    Grade III  posterior  glide R clavicle at R SC joint, added MWM into horizontal adduction with therapist performing glide x5 reps x 2 sets.    Grade III inferior glide R clavicle at SC joint. MWM into flexion with PT performing glide x 5 reps x  2 sets.      Neuromuscular Re-education     Therapeutic Activity     Therapeutic Exercises 5 Verbally reviewed HEP  Exercises:  Exercise #1: Prone I- handouts given for T's and Y's- not attempted, pt unable to lay prone   Comment #1: HEP x 5 sec hold x 10-15 reps  Exercise #2: Sidelying ER  Comment #2: HEP 2# 2 x 20-30 reps, cues for R shoulder position  Exercise #3: Serratus wall clock   Comment #3: HEP L2 band 3-4 reps on both UE - progressed and performed today  Exercise #4: Ball on wall   Comment #4: HEP x 20-30 reps C/CC  Exercise #5: Serratus wall slide  Comment #5: Reviewed from previous HEP L1 band with serratus activation   Exercise #6: Standing shoulder flexion, abduction, scaption  Comment #6: 2# 2x20, standing, cues for scap retraction and keeping UT relaxed  Exercise #7: Standing IR with L2 band   Comment #7: 2 x 20 cues for posture, keeping UT relaxed        Gait training     Modality__________________                Total 35    Blank areas are intentional and mean the treatment did not include these items.       Crystal Aleman  3/14/2019

## 2021-06-26 NOTE — PROGRESS NOTES
"Progress Notes by Tadeo Strong DO at 7/22/2018  8:50 AM     Author: Tadeo Strong DO Service: -- Author Type: Physician    Filed: 7/22/2018 10:46 PM Encounter Date: 7/22/2018 Status: Signed    : Tadeo Strong DO (Physician)       Chief Complaint   Patient presents with   ? Head Lice - tried OTC treatment wants rx for treatment     History of Present Illness: Rooming staff notes reviewed. Patient treated self with OTC \"Rid\" yesterday, and live lice have been seen in the hair of another family member.  She has a family member who has had prior good treatment results with ivermectin in the past.  She would like treatment with the oral form of this medication.    Review of systems: See history of present illness, otherwise negative.     Current Outpatient Prescriptions   Medication Sig Dispense Refill   ? albuterol (PROAIR HFA;PROVENTIL HFA;VENTOLIN HFA) 90 mcg/actuation inhaler Inhale 2 puffs every 6 (six) hours as needed for wheezing. 18 g 11   ? aspirin 81 MG EC tablet Take 1 tablet (81 mg total) by mouth daily.  0   ? b complex vitamins tablet Take 1 tablet by mouth daily.     ? chlorpheniramine (CHLOR-TRIMETON) 4 mg tablet Take 1 mg by mouth every 6 (six) hours as needed for allergies.     ? MAGNESIUM CARBONATE ORAL Take 1 capsule by mouth daily.     ? PRASTERONE, DHEA, (DHEA ORAL) Take 1 capsule by mouth daily.     ? sulfacetamide sodium 10 % Susp Apply  Topically twice daily 118 mL 12   ? turmeric root extract 500 mg cap Take 1 capsule by mouth daily.     ? UNABLE TO FIND Take 2 capsules by mouth daily. Med Name: adren-all      ? UNABLE TO FIND Take 1 capsule by mouth at bedtime. Med Name: Thyromin made by [employer]     ? UNABLE TO FIND Take 1 tablet by mouth 2 (two) times a day. Med Name: Chasteberry Plus     ? UNABLE TO FIND Take 1 capsule by mouth 2 (two) times a day. Med Name: Nutridyn I-3-C Plus     ? UNABLE TO FIND Take 3 drops by mouth once. Med Name: K&B by [employer]   "   ? ivermectin (STROMECTOL) 3 mg Tab Take 4 tablets orally every 10 days for 2 doses. 8 tablet 0   ? UNABLE TO FIND Med Name: progesterone/testosterone 100/0.5mg  Dissove 1/16 sublingual  6-13 of month take 1/16 daily; 14-28 of month take 1/16th of tab bid.       No current facility-administered medications for this visit.        No past medical history on file.   Past Surgical History:   Procedure Laterality Date   ? TN LIGATN SHORT SAPHEN      Description: Venous Ligation Short Saphenous Vein;  Recorded: 10/04/2012;      Social History     Social History   ? Marital status:      Spouse name: N/A   ? Number of children: N/A   ? Years of education: N/A     Social History Main Topics   ? Smoking status: Never Smoker   ? Smokeless tobacco: Never Used   ? Alcohol use Yes      Comment: rare   ? Drug use: No   ? Sexual activity: Yes     Partners: Male     Birth control/ protection: None     Other Topics Concern   ? None     Social History Narrative       History   Smoking Status   ? Never Smoker   Smokeless Tobacco   ? Never Used      Exam:   Blood pressure 104/56, pulse 67, temperature 97.9  F (36.6  C), temperature source Oral, resp. rate 16, weight 130 lb 8 oz (59.2 kg), last menstrual period 07/19/2018, SpO2 100 %, not currently breastfeeding.    EXAM:   General: Vital signs reviewed.  Patient is in no acute appearing distress.  Breathing appears nonlabored.  Patient is alert and oriented ×3.  Examination of hair and scalp shows few scattered knits seen, no live lice.    Assessment/Plan   1. Lice  ivermectin (STROMECTOL) 3 mg Tab    DISCONTINUED: ivermectin (STROMECTOL) 3 mg Tab       Patient Instructions   Also see info below. Be seen again in 3-4 weeks if symptoms are not better, sooner if feeling any worse.    Head Lice    Lice are tiny insects about 1/4 inch in length. Head lice infect only the scalp. They make your scalp feel very itchy. Lice lay eggs that are called nits. They look like tiny white specs  stuck to the hair. They dont brush away or wash off like dandruff. Lice are easily spread by close contact with an infected person. They are also spread by sharing personal items such as hats, fonseca, brushes, towels, and bedding. You don't get head lice from dirty hair or poor hygiene. Lice cant hop or fly, but they can crawl.  To live, adult lice must feed on blood. If lice fall off a person, they die within 1 to 2 days. They dont spread disease.  Head lice symptoms include:    Feeling that something is crawling in your hair    Itching caused by an allergic reaction to the saliva of lice. (Itching alone doesnt mean you have lice.)    Sores on your head (from scratching)    Seeing lice or nits  Home care  Head lice and nits dont wash off by cleaning your hair with regular shampoo. Treatment is needed if you see live lice. There are medicine and nonmedicine treatments. If you only find nits, this doesnt mean you have an active infection needing treatment. The nits can stay after the lice are dead and gone.  As you treat your head lice, also follow these steps:    Machine-wash all your hats, scarves, coats, bed linens, and towels in hot water.    Use your dryers hot cycle to dry these items. Dry clean any clothing, bed linens, or stuffed animals that cant be washed this way. Or you can seal them in a plastic bag for 2 weeks. Lice will die during this time.    Fonseca, brushes, barrettes, hair ties, and curlers may be cleaned with a disinfectant or rubbing alcohol. Then rinse well with clean water.    Vacuum all rugs, carpets, and mattresses that were used while you were infected.    Sex partners and household members should be treated at the same time to prevent re-infection.    Avoid sexual contact until rechecked by your healthcare provider to confirm that all lice are gone.  Medicine  Both prescription and over-the-counter medicines are available. These include medicated creams, lotions, or shampoos. Prescription  pills are also available. Medicines may not always destroy the eggs or nits. A second treatment is usually advised 7 days later. If you see live lice after a second treatment, talk with your provider. Also talk with your provider if you find lice or nits in your eyebrows or eyelashes.  When treating lice with medicine:    Don't use the medicine around your eyes. If it gets in your eyes, wash them out thoroughly.    Don't use it inside your nose, ear, mouth, vagina, or on your eyebrows or eyelashes.    Pregnant or breastfeeding women and children younger than 2 years old should not use it until discussing it with your provider.  If your healthcare provider recommends a medicine, use it as follows:  1. Wash your hair with your regular shampoo.  2. Rinse with water and then towel dry. The towel will need to be washed, as there could be lice on it.  3. Put enough of the medicated cream rinse in to soak the entire hair and scalp area. This includes behind the ears and the back of the neck.  4. Rinse well after 10 minutes. Leaving it on longer will not make it work better.  5. Once you have washed the medicine out of the hair, use a special fine-toothed comb called a nit comb. This is designed to remove the lice and nits.  6. Stroke the comb through 1 section of hair at a time. Go from scalp to hair tip, cleaning the comb after each stroke.  Nonmedicine treatment  Medicines are usually the most effective treatment. But if you don't want to use chemicals, there is a treatment called wet combing. This is a longer process. It can take as much as an hour each time to do it thoroughly. A special nit comb is needed.  Since no medicine was used to kill the lice, you will need to wet comb your hair a few times. Follow these steps:  1. Wash your hair as usual, using your regular shampoo.  2. Put on lots of conditioner.  3. Use a regular comb to untangle and straighten your hair.  4. Switch to the nit comb. Stroke the comb  carefully through your hair. Go from the scalp to the tips of the hair.  5. Remove any lice or nits by wiping or rinsing off the comb.  6. Do this through every part of your hair. Do a small area at a time. Don't miss any section.  7. When finished, rinse out the conditioner. Repeat the combing 3 more times.  Note: Repeat the wet-combing process every 4 days. Keep doing this until you don't see lice for 3 sessions in a row.  Medicines to treat symptoms  Itching probably causes the most discomfort. Over-the-counter antihistamines that have diphenhydramine are sold at pharmacies and grocery stores. Use an antihistamine in pill form, not a cream. If you were not given a prescription antihistamine, then you may use an over-the-counter version to reduce itching if large areas of the skin are involved. This medicine may make you sleepy. So use lower doses during the day and higher doses at bedtime. Some antihistamines wont make you so sleepy. They are a good choice for daytime use. Note: Dont use medicine that has diphenhydramine if you have glaucoma. Also dont use it if you are a man who has trouble urinating due to an enlarged prostate.  You may be given antibiotics for a bacterial infection. This is usually caused by scratching the scalp. Take the antibiotics until they are finished. Keep taking them even if the wound looks better. This helps make sure that the infection has cleared.  Follow-up care  Follow up with your healthcare provider, or as advised. Call your provider if you still have itching on your scalp or see live lice in your hair 7 days after the first treatment.  When to seek medical advice  Call your healthcare provider right away if any of these occur:    Itching gets worse and antihistamines don't help    Scalp becomes swollen or tender    Scalp sores are draining pus (a creamy yellow or white liquid)    Hair becomes matted or smells bad    Other signs of infection, like increasing redness, swelling,  pain, or pus drainage    Trouble breathing  Date Last Reviewed: 8/1/2016 2000-2017 [employer]. [address] [place] [address], [place] [address]. All rights reserved. This information is not intended as a substitute for professional medical care. Always follow your healthcare professional's instructions.           Tadeo Strong,

## 2021-06-28 NOTE — PROGRESS NOTES
Progress Notes by Ayala Joseph MD at 11/14/2019  8:45 AM     Author: Ayala Joseph MD Service: -- Author Type: Physician    Filed: 11/14/2019  2:55 PM Encounter Date: 11/14/2019 Status: Signed    : Ayala Joseph MD (Physician)       FEMALE PREVENTATIVE EXAM    Assessment and Plan:       1. Dry mouth  Last labs addressing this were in 2013.  We will repeat some labs today.  There is a strong family history of autoimmune diseases.  Continue to increase water intake.    - C-Reactive Protein  - Erythrocyte Sedimentation Rate    2. Dry eyes    - C-Reactive Protein  - Erythrocyte Sedimentation Rate    3. Routine general medical examination at a health care facility  Continue with exercise  Continue with a healthy diet.  Up-to-date on Pap smear and immunizations.    4. Elevated lipase  CT scan was negative.  Will repeat today.  - Lipase    5. Neuroma of foot  Is not really interested in pursuing surgery at this time.  Can use anti-inflammatories and ice over the area.  Consider changing shoes to once with a wider toe box and not using compression stockings with running.    6. Asymptomatic varicose veins of both lower extremities    - Compression stockings 20/30 mmHg; Knee    7. Healthy adult on routine physical examination       Next follow up:  No follow-ups on file.    Immunization Review  Adult Imm Review: No immunizations due today      I discussed the following with the patient:   Adult Healthy Living: Importance of regular exercise  Healthy nutrition  Stress management  Herbal medications/alternative medical therapies        Subjective:   Chief Complaint: Danya Brooks is an 44 y.o. female here for a preventative health visit.     HPI: Doing well.  She does complain of some pain in her right foot.  This is between her third and fourth toes.  The seem to start after he had foot surgery on that side.  No history of similar.  It waxes and wanes.  It seems to get worse when she runs.  She  "does use compression stockings most of the time including when she runs.  These also extend down onto her feet and squeeze her feet.  She continues to have some mild dysphagia, dry mouth, dry eyes.  She did see GI and they did extensive work-up including an endoscopy, biopsies, colonoscopy.  She did have a prior CAT scan for her elevated lipase.  This was negative.    She continues to use compression stockings for her history of varicose veins    Healthy Habits  Are you taking a daily aspirin? Yes  Do you typically exercising at least 40 min, 3-4 times per week?  Yes  Do you usually eat at least 4 servings of fruit and vegetables a day, include whole grains and fiber and avoid regularly eating high fat foods? Yes  Have you had an eye exam in the past two years? Yes  Do you see a dentist twice per year? Yes  Do you have any concerns regarding sleep? YES    Safety Screen  If you own firearms, are they secured in a locked gun cabinet or with trigger locks? The patient does not own any firearms  Do you feel you are safe where you are living?: Yes (11/14/2019  8:35 AM)  Do you feel you are safe in your relationship(s)?: Yes (11/14/2019  8:35 AM)      Review of Systems:  Please see above.  The rest of the review of systems are negative for all systems.     Pap History:   Last 3 PAP results:  No results found for: PAP  Cancer Screening       Status Date      PAP SMEAR Next Due 5/2/2024      Done 5/2/2019 GYNECOLOGIC CYTOLOGY (PAP SMEAR)     Patient has more history with this topic...              History     Reviewed By Date/Time Sections Reviewed    Tonya Duran LPN 11/14/2019  8:33 AM Tobacco            Objective:   Vital Signs:   Visit Vitals  BP (!) 82/58   Pulse (!) 53   Temp 97.7  F (36.5  C) (Oral)   Resp 16   Ht 5' 6.25\" (1.683 m)   Wt 132 lb (59.9 kg)   LMP 11/07/2019   SpO2 95%   Breastfeeding No   BMI 21.14 kg/m           PHYSICAL EXAM  General Appearance: Alert, cooperative, no distress, appears stated " age  Head: Normocephalic, without obvious abnormality, atraumatic  Eyes: PERRL, conjunctiva/corneas clear, EOM's intact  Ears: Normal TM's and external ear canals, both ears  Nose: Nares normal, septum midline,mucosa normal, no drainage  Throat: Lips, mucosa, and tongue normal; teeth and gums normal  Neck: Supple, symmetrical, trachea midline, no adenopathy;  thyroid: not enlarged, symmetric, no tenderness/mass/nodules; no carotid bruit or JVD  Back: Symmetric, no curvature, ROM normal, no CVA tenderness  Lungs: Clear to auscultation bilaterally, respirations unlabored  Breasts: No breast masses, tenderness, asymmetry, or nipple discharge.  Heart: Regular rate and rhythm, S1 and S2 normal, no murmur, rub, or gallop,   Abdomen: Soft, non-tender, bowel sounds active all four quadrants,  no masses, no organomegaly.  Extremities: Extremities normal, atraumatic, no cyanosis or edema.  Tender to palpation between the second and third toe on the right.  No skin changes are noted in this area.  Skin: Skin color, texture, turgor normal, no rashes or lesions.   noted.   Lymph nodes: Cervical, supraclavicular, and axillary nodes normal  Neurologic: Normal                Medication List          Accurate as of November 14, 2019  2:54 PM. If you have any questions, ask your nurse or doctor.            CONTINUE taking these medications    albuterol 90 mcg/actuation inhaler  Also known as:  PROAIR HFA;PROVENTIL HFA;VENTOLIN HFA  INSTRUCTIONS:  Inhale 2 puffs every 6 (six) hours as needed for wheezing.  Doctor's comments:  May substitute Proair HFA, Ventolin HFA, albuterol HFA, Xopenex HFA        aspirin 81 MG EC tablet  INSTRUCTIONS:  Take 1 tablet (81 mg total) by mouth daily.        b complex vitamins tablet  INSTRUCTIONS:  Take 1 tablet by mouth daily.        chlorpheniramine 4 mg tablet  Also known as:  CHLOR-TRIMETON  INSTRUCTIONS:  Take 1 mg by mouth every 6 (six) hours as needed for allergies.        DHEA  ORAL  INSTRUCTIONS:  Take 1 capsule by mouth daily.        MAGNESIUM CARBONATE ORAL  INSTRUCTIONS:  Take 1 capsule by mouth daily.        sulfacetamide sodium 10 % Susp  INSTRUCTIONS:  Apply  Topically twice daily        UNABLE TO FIND  INSTRUCTIONS:  Take 2 capsules by mouth daily. Med Name: adren-all        UNABLE TO FIND  INSTRUCTIONS:  Take 1 capsule by mouth at bedtime. Med Name: Thyromin made by [employer]        UNABLE TO FIND  INSTRUCTIONS:  Take 1 tablet by mouth 2 (two) times a day. Med Name: Chasteberry Plus        UNABLE TO FIND  INSTRUCTIONS:  Take 1 capsule by mouth 2 (two) times a day. Med Name: Nutridyn I-3-C Plus        UNABLE TO FIND  INSTRUCTIONS:  Take 3 drops by mouth once. Med Name: K&B by [employer]        XIIDRA 5 % Dpet  Generic drug:  lifitegrast           STOP taking these medications    ivermectin 3 mg Tab  Also known as:  STROMECTOL  Stopped by:  Ayala Joseph MD     ranitidine 150 MG tablet  Also known as:  ZANTAC  Stopped by:  Ayala Joseph MD     turmeric root extract 500 mg Cap  Stopped by:  Ayala Joseph MD     UNABLE TO FIND  Stopped by:  Ayala Joseph MD            Additional Screenings Completed Today:

## 2021-07-03 NOTE — ADDENDUM NOTE
Addendum Note by Nathaniel Fatima DPM at 5/25/2017  2:22 PM     Author: Nathaniel Fatima DPM Service: -- Author Type: Physician    Filed: 5/25/2017  2:22 PM Encounter Date: 5/25/2017 Status: Signed    : Nathaniel Fatima DPM (Physician)    Addended by: NATHANIEL FATIMA on: 5/25/2017 02:22 PM        Modules accepted: Orders

## 2021-09-16 ENCOUNTER — OFFICE VISIT (OUTPATIENT)
Dept: FAMILY MEDICINE | Facility: CLINIC | Age: 46
End: 2021-09-16
Payer: COMMERCIAL

## 2021-09-16 VITALS
DIASTOLIC BLOOD PRESSURE: 52 MMHG | RESPIRATION RATE: 16 BRPM | HEART RATE: 86 BPM | BODY MASS INDEX: 20.38 KG/M2 | WEIGHT: 127.25 LBS | TEMPERATURE: 98.1 F | SYSTOLIC BLOOD PRESSURE: 90 MMHG | OXYGEN SATURATION: 97 %

## 2021-09-16 DIAGNOSIS — J45.990 EXERCISE-INDUCED ASTHMA: ICD-10-CM

## 2021-09-16 DIAGNOSIS — Z80.41 FAMILY HISTORY OF MALIGNANT NEOPLASM OF OVARY: ICD-10-CM

## 2021-09-16 DIAGNOSIS — Z13.220 SCREENING FOR LIPID DISORDERS: ICD-10-CM

## 2021-09-16 DIAGNOSIS — L65.9 HAIR LOSS: ICD-10-CM

## 2021-09-16 DIAGNOSIS — R42 POSTURAL LIGHTHEADEDNESS: Primary | ICD-10-CM

## 2021-09-16 LAB
ALBUMIN SERPL-MCNC: 4.2 G/DL (ref 3.5–5)
ALP SERPL-CCNC: 43 U/L (ref 45–120)
ALT SERPL W P-5'-P-CCNC: 16 U/L (ref 0–45)
ANION GAP SERPL CALCULATED.3IONS-SCNC: 11 MMOL/L (ref 5–18)
AST SERPL W P-5'-P-CCNC: 21 U/L (ref 0–40)
BASOPHILS # BLD AUTO: 0 10E3/UL (ref 0–0.2)
BASOPHILS NFR BLD AUTO: 0 %
BILIRUB SERPL-MCNC: 0.9 MG/DL (ref 0–1)
BUN SERPL-MCNC: 12 MG/DL (ref 8–22)
CALCIUM SERPL-MCNC: 10.1 MG/DL (ref 8.5–10.5)
CHLORIDE BLD-SCNC: 106 MMOL/L (ref 98–107)
CHOLEST SERPL-MCNC: 216 MG/DL
CO2 SERPL-SCNC: 25 MMOL/L (ref 22–31)
CREAT SERPL-MCNC: 0.86 MG/DL (ref 0.6–1.1)
EOSINOPHIL # BLD AUTO: 0.1 10E3/UL (ref 0–0.7)
EOSINOPHIL NFR BLD AUTO: 1 %
ERYTHROCYTE [DISTWIDTH] IN BLOOD BY AUTOMATED COUNT: 12.1 % (ref 10–15)
FASTING STATUS PATIENT QL REPORTED: YES
GFR SERPL CREATININE-BSD FRML MDRD: 82 ML/MIN/1.73M2
GLUCOSE BLD-MCNC: 88 MG/DL (ref 70–125)
HCT VFR BLD AUTO: 41.7 % (ref 35–47)
HDLC SERPL-MCNC: 63 MG/DL
HGB BLD-MCNC: 13.9 G/DL (ref 11.7–15.7)
IMM GRANULOCYTES # BLD: 0 10E3/UL
IMM GRANULOCYTES NFR BLD: 0 %
LDLC SERPL CALC-MCNC: 139 MG/DL
LYMPHOCYTES # BLD AUTO: 1.5 10E3/UL (ref 0.8–5.3)
LYMPHOCYTES NFR BLD AUTO: 23 %
MCH RBC QN AUTO: 30.2 PG (ref 26.5–33)
MCHC RBC AUTO-ENTMCNC: 33.3 G/DL (ref 31.5–36.5)
MCV RBC AUTO: 91 FL (ref 78–100)
MONOCYTES # BLD AUTO: 0.4 10E3/UL (ref 0–1.3)
MONOCYTES NFR BLD AUTO: 6 %
NEUTROPHILS # BLD AUTO: 4.6 10E3/UL (ref 1.6–8.3)
NEUTROPHILS NFR BLD AUTO: 70 %
PLATELET # BLD AUTO: 270 10E3/UL (ref 150–450)
POTASSIUM BLD-SCNC: 4.5 MMOL/L (ref 3.5–5)
PROT SERPL-MCNC: 7.3 G/DL (ref 6–8)
RBC # BLD AUTO: 4.6 10E6/UL (ref 3.8–5.2)
SODIUM SERPL-SCNC: 142 MMOL/L (ref 136–145)
TRIGL SERPL-MCNC: 72 MG/DL
TSH SERPL DL<=0.005 MIU/L-ACNC: 3.03 UIU/ML (ref 0.3–5)
WBC # BLD AUTO: 6.5 10E3/UL (ref 4–11)

## 2021-09-16 PROCEDURE — 99396 PREV VISIT EST AGE 40-64: CPT | Performed by: FAMILY MEDICINE

## 2021-09-16 PROCEDURE — 80050 GENERAL HEALTH PANEL: CPT | Performed by: FAMILY MEDICINE

## 2021-09-16 PROCEDURE — 80061 LIPID PANEL: CPT | Performed by: FAMILY MEDICINE

## 2021-09-16 PROCEDURE — 36415 COLL VENOUS BLD VENIPUNCTURE: CPT | Performed by: FAMILY MEDICINE

## 2021-09-16 RX ORDER — ALBUTEROL SULFATE 90 UG/1
AEROSOL, METERED RESPIRATORY (INHALATION)
Qty: 18 G | Refills: 3 | Status: SHIPPED | OUTPATIENT
Start: 2021-09-16

## 2021-09-16 ASSESSMENT — ASTHMA QUESTIONNAIRES
QUESTION_3 LAST FOUR WEEKS HOW OFTEN DID YOUR ASTHMA SYMPTOMS (WHEEZING, COUGHING, SHORTNESS OF BREATH, CHEST TIGHTNESS OR PAIN) WAKE YOU UP AT NIGHT OR EARLIER THAN USUAL IN THE MORNING: NOT AT ALL
QUESTION_2 LAST FOUR WEEKS HOW OFTEN HAVE YOU HAD SHORTNESS OF BREATH: NOT AT ALL
QUESTION_5 LAST FOUR WEEKS HOW WOULD YOU RATE YOUR ASTHMA CONTROL: WELL CONTROLLED
ACT_TOTALSCORE: 20
QUESTION_4 LAST FOUR WEEKS HOW OFTEN HAVE YOU USED YOUR RESCUE INHALER OR NEBULIZER MEDICATION (SUCH AS ALBUTEROL): NOT AT ALL
QUESTION_1 LAST FOUR WEEKS HOW MUCH OF THE TIME DID YOUR ASTHMA KEEP YOU FROM GETTING AS MUCH DONE AT WORK, SCHOOL OR AT HOME: ALL OF THE TIME

## 2021-09-16 NOTE — PROGRESS NOTES
SUBJECTIVE:   CC: Danya Brooks is an 46 year old woman who presents for preventive health visit.     1.  Has noted more hair loss.    Ongoing in the past few years. Worsening.  Is taking collagen.      2. Right elbow pain.  Improved with taking magnesium.  Also has a tight neck.  Push ups worsen this pain.      3.  Has some feeling like she is going to lack out, and has tunnel vision with orse when she is too hot  Worse when she stands up quickly.  This happens nearly daily now if she stands up fast. Worse with exercising.  She has to stop and stand and it resolves after 15 seconds.  She feels a little bit woozy when it happens.    She has had 2 visual migraines in the past year . She has never had this at rest.    This only happens with posture change.  She tries to drink water. She is not loading with water prior to exercising.        Patient has been advised of split billing requirements and indicates understanding: Yes  HPI        If she should being seeing a dermatology falling out hair    Today's PHQ-2 Score:   PHQ-2 ( 1999 [employer]) 9/16/2021   Q1: Little interest or pleasure in doing things 0   Q2: Feeling down, depressed or hopeless 0   PHQ-2 Score 0   Q1: Little interest or pleasure in doing things Not at all   Q2: Feeling down, depressed or hopeless Not at all   PHQ-2 Score 0       Abuse: Current or Past (Physical, Sexual or Emotional) - no  Do you feel safe in your environment? Yes    Have you ever done Advance Care Planning? (For example, a Health Directive, POLST, or a discussion with a medical provider or your loved ones about your wishes): Yes, patient states has an Advance Care Planning document and will bring a copy to the clinic.    Social History     Tobacco Use     Smoking status: Never Smoker     Smokeless tobacco: Never Used   Substance Use Topics     Alcohol use: Yes     Comment: Alcoholic Drinks/day: rare         Alcohol Use 9/16/2021   Prescreen: >3 drinks/day or >7 drinks/week? No        Reviewed orders with patient.  Reviewed health maintenance and updated orders accordingly - Yes      Breast Cancer Screening:  Any new diagnosis of family breast, ovarian, or bowel cancer? Yes sister ovaryian      FHS-7: No flowsheet data found.    Mammogram Screening - Offered annual screening and updated Health Maintenance for mutual plan based on risk factor consideration    Pertinent mammograms are reviewed under the imaging tab.    History of abnormal Pap smear: NO - age 30-65 PAP every 5 years with negative HPV co-testing recommended  PAP / HPV Latest Ref Rng & Units 5/2/2019   PAP Negative for squamous intraepithelial lesion or malignancy. Negative for squamous intraepithelial lesion or malignancy  Electronically signed by Rosalinda Tsang CT (ASCP) on 5/9/2019 at 10:41 AM     HPV16 NEG Negative   HPV18 NEG Negative   HRHPV NEG Negative     Reviewed and updated as needed this visit by clinical staff  Tobacco  Allergies  Meds              Reviewed and updated as needed this visit by Provider                No past medical history on file.   Past Surgical History:   Procedure Laterality Date     AR LIGATMICHELLE SHORT SAPHEN      Description: Venous Ligation Short Saphenous Vein;  Recorded: 10/04/2012;     OB History   No obstetric history on file.       Review of Systems  CONSTITUTIONAL: NEGATIVE for fever, chills, change in weight  INTEGUMENTARU/SKIN: NEGATIVE for worrisome rashes, moles or lesions  EYES: NEGATIVE for vision changes or irritation  ENT: NEGATIVE for ear, mouth and throat problems  RESP: NEGATIVE for significant cough or SOB  BREAST: NEGATIVE for masses, tenderness or discharge  CV: NEGATIVE for chest pain, palpitations or peripheral edema  GI: NEGATIVE for nausea, abdominal pain, heartburn, or change in bowel habits  : NEGATIVE for unusual urinary or vaginal symptoms. Periods are regular.  MUSCULOSKELETAL: NEGATIVE for significant arthralgias or myalgia  NEURO: NEGATIVE for weakness,  dizziness or paresthesias  PSYCHIATRIC: NEGATIVE for changes in mood or affect     OBJECTIVE:   BP 90/52   Pulse 86   Temp 98.1  F (36.7  C) (Oral)   Resp 16   Wt 57.7 kg (127 lb 4 oz)   LMP 09/14/2021 (Exact Date)   SpO2 97%   Breastfeeding No   BMI 20.38 kg/m    Physical Exam  General Appearance: Alert, cooperative, no distress, appears stated age  Head: Normocephalic, without obvious abnormality, atraumatic  Eyes: PERRL, conjunctiva/corneas clear, EOM's intact  Ears: Normal TM's and external ear canals, both ears  Nose: Nares normal, septum midline,mucosa normal, no drainage  Throat: Lips, mucosa, and tongue normal; teeth and gums normal  Neck: Supple, symmetrical, trachea midline, no adenopathy;  thyroid: not enlarged, symmetric, no tenderness/mass/nodules; no carotid bruit or JVD  Back: Symmetric, no curvature, ROM normal, no CVA tenderness  Lungs: Clear to auscultation bilaterally, respirations unlabored  Breasts: No breast masses, tenderness, asymmetry, or nipple discharge.  Heart: Regular rate and rhythm, S1 and S2 normal, no murmur, rub, or gallop,   Abdomen: Soft, non-tender, bowel sounds active all four quadrants,  no masses, no organomegaly.  Extremities: Extremities normal, atraumatic, no cyanosis or edema  Skin: Skin color, texture, turgor normal, no rashes or lesions.   noted. Some moles noted.    Lymph nodes: Cervical, supraclavicular, and axillary nodes normal  Neurologic: Normal       Diagnostic Test Results:  Labs reviewed in Epic  Results for orders placed or performed in visit on 09/16/21 (from the past 24 hour(s))   TSH with free T4 reflex   Result Value Ref Range    TSH 3.03 0.30 - 5.00 uIU/mL   Comprehensive metabolic panel (BMP + Alb, Alk Phos, ALT, AST, Total. Bili, TP)   Result Value Ref Range    Sodium 142 136 - 145 mmol/L    Potassium 4.5 3.5 - 5.0 mmol/L    Chloride 106 98 - 107 mmol/L    Carbon Dioxide (CO2) 25 22 - 31 mmol/L    Anion Gap 11 5 - 18 mmol/L    Urea Nitrogen 12 8  - 22 mg/dL    Creatinine 0.86 0.60 - 1.10 mg/dL    Calcium 10.1 8.5 - 10.5 mg/dL    Glucose 88 70 - 125 mg/dL    Alkaline Phosphatase 43 (L) 45 - 120 U/L    AST 21 0 - 40 U/L    ALT 16 0 - 45 U/L    Protein Total 7.3 6.0 - 8.0 g/dL    Albumin 4.2 3.5 - 5.0 g/dL    Bilirubin Total 0.9 0.0 - 1.0 mg/dL    GFR Estimate 82 >60 mL/min/1.73m2   CBC with platelets and differential    Narrative    The following orders were created for panel order CBC with platelets and differential.  Procedure                               Abnormality         Status                     ---------                               -----------         ------                     CBC with platelets and d...[[id number]]                      Final result                 Please view results for these tests on the individual orders.   Lipid panel   Result Value Ref Range    Cholesterol 216 (H) <=199 mg/dL    Triglycerides 72 <=149 mg/dL    Direct Measure HDL 63 >=50 mg/dL    LDL Cholesterol Calculated 139 (H) <=129 mg/dL    Patient Fasting > 8hrs? Yes    CBC with platelets and differential   Result Value Ref Range    WBC Count 6.5 4.0 - 11.0 10e3/uL    RBC Count 4.60 3.80 - 5.20 10e6/uL    Hemoglobin 13.9 11.7 - 15.7 g/dL    Hematocrit 41.7 35.0 - 47.0 %    MCV 91 78 - 100 fL    MCH 30.2 26.5 - 33.0 pg    MCHC 33.3 31.5 - 36.5 g/dL    RDW 12.1 10.0 - 15.0 %    Platelet Count 270 150 - 450 10e3/uL    % Neutrophils 70 %    % Lymphocytes 23 %    % Monocytes 6 %    % Eosinophils 1 %    % Basophils 0 %    % Immature Granulocytes 0 %    Absolute Neutrophils 4.6 1.6 - 8.3 10e3/uL    Absolute Lymphocytes 1.5 0.8 - 5.3 10e3/uL    Absolute Monocytes 0.4 0.0 - 1.3 10e3/uL    Absolute Eosinophils 0.1 0.0 - 0.7 10e3/uL    Absolute Basophils 0.0 0.0 - 0.2 10e3/uL    Absolute Immature Granulocytes 0.0 <=0.0 10e3/uL       ASSESSMENT/PLAN:   (R42) Postural lightheadedness  (primary encounter diagnosis)  Comment: ongoing for many years.    Plan: Adult Neurology Referral,  "TSH with free T4         reflex, Comprehensive metabolic panel (BMP +         Alb, Alk Phos, ALT, AST, Total. Bili, TP), CBC         with platelets and differential        Refer to neurology for further workup.    I reviewed with her to drink a large glass of water prior to exercise to see if this improves some of her symptoms.    (J45.990) Exercise-induced asthma  Comment: Stable at this time.  Plan: albuterol (VENTOLIN HFA) 108 (90 Base) MCG/ACT         inhaler        She will let me know if she has any increased need for albuterol or any worsening of her symptoms.  She will continue to use albuterol prior to exercise as needed.    (L65.9) Hair loss  Comment: Uncertain etiology  Plan: Adult Dermatology Referral, TSH with free T4         reflex        Rule out organic causes.  Refer to dermatology.    (Z13.220) Screening for lipid disorders  Comment: History of elevated LDL  Plan: Lipid panel        Has a healthy diet and regular physical activity    (Z80.41) Family history of malignant neoplasm of ovary  Comment: To her knowledge there is no history of BRCA1 or 2.  Plan: Adult Genetics & Metabolism Referral        Will refer to genetics for consult regarding testing for genetic links for ovarian cancer and recommendations for screening.      Patient has been advised of split billing requirements and indicates understanding: Yes  COUNSELING:  Reviewed preventive health counseling, as reflected in patient instructions       Regular exercise       Healthy diet/nutrition       Consider Hep C screening for all patients one time for ages 18-79 years       (Risa)menopause management       Advance Care Planning    Estimated body mass index is 20.38 kg/m  as calculated from the following:    Height as of 11/14/19: 1.683 m (5' 6.25\").    Weight as of this encounter: 57.7 kg (127 lb 4 oz).        She reports that she has never smoked. She has never used smokeless tobacco.      Counseling Resources:  ATP IV Guidelines  Pooled " Cohorts Equation Calculator  Breast Cancer Risk Calculator  BRCA-Related Cancer Risk Assessment: FHS-7 Tool  FRAX Risk Assessment  ICSI Preventive Guidelines  Dietary Guidelines for Americans, 2010  [employer]'s MyPlate  ASA Prophylaxis  Lung CA Screening    Ayala Joseph MD  [facility]

## 2021-09-17 ENCOUNTER — TELEPHONE (OUTPATIENT)
Dept: FAMILY MEDICINE | Facility: CLINIC | Age: 46
End: 2021-09-17

## 2021-09-17 ASSESSMENT — ASTHMA QUESTIONNAIRES: ACT_TOTALSCORE: 20

## 2021-09-17 NOTE — TELEPHONE ENCOUNTER
----- Message from Sloan Newsome MD sent at 8/22/2019 10:05 AM CDT -----  Please send normal results letter. Negative pap and HPV. F/U 1 year for annual exam, pap in 5 years. Thank you. Left Message for patient to return call.    Let patient know that form is completed and signed.     Place form in  pickup folder.

## 2021-10-02 ENCOUNTER — HEALTH MAINTENANCE LETTER (OUTPATIENT)
Age: 46
End: 2021-10-02

## 2021-11-04 ENCOUNTER — MYC MEDICAL ADVICE (OUTPATIENT)
Dept: FAMILY MEDICINE | Facility: CLINIC | Age: 46
End: 2021-11-04

## 2022-03-19 ENCOUNTER — HEALTH MAINTENANCE LETTER (OUTPATIENT)
Age: 47
End: 2022-03-19

## 2022-07-09 ENCOUNTER — HEALTH MAINTENANCE LETTER (OUTPATIENT)
Age: 47
End: 2022-07-09

## 2022-08-12 NOTE — PROGRESS NOTES
[facility] Daily Progress     Patient Name: Danya Brooks  Date: 2/14/2019  Visit #: 3/6-8  PTA visit #:    Referral Diagnosis: R shoulder pain  Referring provider: Ayala Joseph MD  Visit Diagnosis:     ICD-10-CM    1. Chronic right shoulder pain M25.511     G89.29    2. Scapular dyskinesis G25.89    3. Generalized muscle weakness M62.81    4. Poor posture R29.3          Assessment:   Patient reports improving shoulder pain. She has been able to return to volleyball without increased pain, but has not attempted HIIT class at this time. Plan to FU in 3-4 weeks after pt has attempted HIIT class.    Patient is benefitting from skilled physical therapy and is making steady progress toward functional goals.  Patient is appropriate to continue with skilled physical therapy intervention, as indicated by initial plan of care.    Goal Status:  Pt. will demonstrate/verbalize independence in self-management of condition in : 2 weeks  Pt. will be independent with home exercise program in : 2 weeks    Pt will: hit volleyball recreationally (not competition) without increased R shoulder pain to return to volleyball practice in 8 weeks  Pt will: return to HIIT class without increased R shoulder pain following to exercise in 8 weeks  Pt will: return to lifting/carrying groceries/laundry without increased shoulder pain to improve ADL's in 8 weeks  Pt will: return to reaching overhead into cupboard without increased R shoulder pain to improve work function in 8 weeks      Plan / Patient Education:     Continue with initial plan of care.  Progress with home program as tolerated.    Subjective:   Patient reports her shoulder is doing well, continues to have some pain with standing IR and weakness with moving arm inwards/reaching. Uses 4 ibuprofen when she plays volleyball but is able to play without significant pain.      Objective:    Improved flexion- WFL, although slightly restricted at end range compared to L  Patient states she's been in rehab for one week following recent hospitalization. States while in hospital she was on steroids and given insulin due to elevated bs levels. In rehab she's just been receiving Glipizide 5 mg daily as no longer on steroids. Reports some levels being under 100 and having to drink orange juice. States typically her bs numbers are 100, 128, 130. Patient refused the Glipizide today to \"see what happens,\" regarding her numbers. Patient asking if Dr. Cristino Cordova could give her a call as she'd like to discuss her situation with him. hypermobile side   Restrictions in R SC joint inferior and posterior movements with flexion and horiz add     Treatment Today     TREATMENT MINUTES COMMENTS   Evaluation     Self-care/ Home management     Manual therapy 10   Grade III  posterior glide R clavicle at R SC joint, added MWM into horizontal adduction with therapist performing glide x5 reps x 2 sets.    Grade III inferior glide R clavicle at SC joint. MWM into flexion with PT performing glide x 5 reps x  2 sets.      Neuromuscular Re-education     Therapeutic Activity     Therapeutic Exercises 25 Exercises added to HEP:    Exercise #6: Standing shoulder flexion, abduction, scaption  Comment #6: 2# 2x20, standing, cues for scap retraction and keeping UT relaxed  Exercise #7: Standing IR with L2 band   Comment #7: 2 x 20 cues for posture, keeping UT relaxed     Discussion of progress, HEP and POC.    Gait training     Modality__________________                Total 35    Blank areas are intentional and mean the treatment did not include these items.       Crystal Aleman  2/14/2019

## 2022-09-03 ENCOUNTER — HEALTH MAINTENANCE LETTER (OUTPATIENT)
Age: 47
End: 2022-09-03

## 2022-11-10 ENCOUNTER — OFFICE VISIT (OUTPATIENT)
Dept: FAMILY MEDICINE | Facility: CLINIC | Age: 47
End: 2022-11-10
Payer: COMMERCIAL

## 2022-11-10 VITALS
TEMPERATURE: 97.3 F | HEIGHT: 67 IN | OXYGEN SATURATION: 99 % | DIASTOLIC BLOOD PRESSURE: 76 MMHG | SYSTOLIC BLOOD PRESSURE: 112 MMHG | BODY MASS INDEX: 20.72 KG/M2 | RESPIRATION RATE: 18 BRPM | WEIGHT: 132 LBS | HEART RATE: 62 BPM

## 2022-11-10 DIAGNOSIS — Z12.31 VISIT FOR SCREENING MAMMOGRAM: ICD-10-CM

## 2022-11-10 DIAGNOSIS — Z92.89 PERSONAL HISTORY OF OTHER MEDICAL TREATMENT: Primary | ICD-10-CM

## 2022-11-10 PROCEDURE — 99213 OFFICE O/P EST LOW 20 MIN: CPT | Performed by: FAMILY MEDICINE

## 2022-11-10 ASSESSMENT — ASTHMA QUESTIONNAIRES
QUESTION_1 LAST FOUR WEEKS HOW MUCH OF THE TIME DID YOUR ASTHMA KEEP YOU FROM GETTING AS MUCH DONE AT WORK, SCHOOL OR AT HOME: NONE OF THE TIME
ACT_TOTALSCORE: 25
ACT_TOTALSCORE: 25
QUESTION_4 LAST FOUR WEEKS HOW OFTEN HAVE YOU USED YOUR RESCUE INHALER OR NEBULIZER MEDICATION (SUCH AS ALBUTEROL): NOT AT ALL
QUESTION_5 LAST FOUR WEEKS HOW WOULD YOU RATE YOUR ASTHMA CONTROL: COMPLETELY CONTROLLED
QUESTION_3 LAST FOUR WEEKS HOW OFTEN DID YOUR ASTHMA SYMPTOMS (WHEEZING, COUGHING, SHORTNESS OF BREATH, CHEST TIGHTNESS OR PAIN) WAKE YOU UP AT NIGHT OR EARLIER THAN USUAL IN THE MORNING: NOT AT ALL
QUESTION_2 LAST FOUR WEEKS HOW OFTEN HAVE YOU HAD SHORTNESS OF BREATH: NOT AT ALL

## 2022-11-10 NOTE — PROGRESS NOTES
"  Assessment & Plan     Personal history of other medical treatment  -Informed pt that I unfortunately do not practice alternative medicine. My practice is evidence-based. I did let her know that I do not think I would be a good fit as her new PCP. She is welcome to continue with me as a provider or search for an alternate provider who may be a better fit.    Visit for screening mammogram  - MA SCREENING DIGITAL BILAT - Future  (s+30)      Mati Soliman DO  [facility]    Michelle Hagan is a 47 year old, presenting for the following health issues:  Consult    New patient. Last PCP left practice.  States choose this provider because she wanted someone that was close to a naturopath as I am a DO.  Has seen a naturopath in the past and prefers non-western medicine.   Wanted to discuss supplements she has been taking for adrenal fatigue.  Due for mammogram. Unsure if prior provider has placed order.  Thought today's visit was a physical.    History of Present Illness       Reason for visit:  Establish care with new pcp    She eats 2-3 servings of fruits and vegetables daily.She consumes 0 sweetened beverage(s) daily.She exercises with enough effort to increase her heart rate 20 to 29 minutes per day.  She exercises with enough effort to increase her heart rate 3 or less days per week.   She is taking medications regularly.       Review of Systems         Objective    /76 (BP Location: Right arm, Patient Position: Sitting, Cuff Size: Adult Regular)   Pulse 62   Temp 97.3  F (36.3  C)   Resp 18   Ht 1.696 m (5' 6.77\")   Wt 59.9 kg (132 lb)   SpO2 99%   BMI 20.82 kg/m    Body mass index is 20.82 kg/m .  Physical Exam   GENERAL: healthy, alert and no distress  RESP: breathing comfortably, no acute respiratory distress  PSYCH: mentation appears normal, affect normal/bright            "

## 2022-12-01 ENCOUNTER — ANCILLARY PROCEDURE (OUTPATIENT)
Dept: MAMMOGRAPHY | Facility: HOSPITAL | Age: 47
End: 2022-12-01
Attending: FAMILY MEDICINE
Payer: COMMERCIAL

## 2022-12-01 DIAGNOSIS — Z12.31 VISIT FOR SCREENING MAMMOGRAM: ICD-10-CM

## 2022-12-01 PROCEDURE — 77067 SCR MAMMO BI INCL CAD: CPT

## 2023-01-05 ENCOUNTER — LAB REQUISITION (OUTPATIENT)
Dept: LAB | Facility: CLINIC | Age: 48
End: 2023-01-05
Payer: COMMERCIAL

## 2023-01-05 DIAGNOSIS — E78.00 PURE HYPERCHOLESTEROLEMIA, UNSPECIFIED: ICD-10-CM

## 2023-01-05 DIAGNOSIS — D22.9 MELANOCYTIC NEVI, UNSPECIFIED: ICD-10-CM

## 2023-01-05 LAB
CHOLEST SERPL-MCNC: 210 MG/DL
HDLC SERPL-MCNC: 63 MG/DL
LDLC SERPL CALC-MCNC: 133 MG/DL
NONHDLC SERPL-MCNC: 147 MG/DL
TRIGL SERPL-MCNC: 71 MG/DL

## 2023-01-05 PROCEDURE — 80061 LIPID PANEL: CPT | Mod: ORL | Performed by: FAMILY MEDICINE

## 2023-01-05 PROCEDURE — 88342 IMHCHEM/IMCYTCHM 1ST ANTB: CPT | Mod: TC,ORL | Performed by: FAMILY MEDICINE

## 2023-01-13 LAB
PATH REPORT.COMMENTS IMP SPEC: NORMAL
PATH REPORT.FINAL DX SPEC: NORMAL
PATH REPORT.GROSS SPEC: NORMAL
PATH REPORT.MICROSCOPIC SPEC OTHER STN: NORMAL
PATH REPORT.MICROSCOPIC SPEC OTHER STN: NORMAL
PATH REPORT.RELEVANT HX SPEC: NORMAL
PHOTO IMAGE: NORMAL

## 2023-01-13 PROCEDURE — 88341 IMHCHEM/IMCYTCHM EA ADD ANTB: CPT | Mod: 26 | Performed by: PATHOLOGY

## 2023-01-13 PROCEDURE — 88360 TUMOR IMMUNOHISTOCHEM/MANUAL: CPT | Mod: 26 | Performed by: PATHOLOGY

## 2023-01-13 PROCEDURE — 88305 TISSUE EXAM BY PATHOLOGIST: CPT | Mod: 26 | Performed by: PATHOLOGY

## 2023-01-13 PROCEDURE — 88342 IMHCHEM/IMCYTCHM 1ST ANTB: CPT | Mod: 26 | Performed by: PATHOLOGY

## 2023-05-11 ENCOUNTER — TRANSFERRED RECORDS (OUTPATIENT)
Dept: HEALTH INFORMATION MANAGEMENT | Facility: CLINIC | Age: 48
End: 2023-05-11
Payer: COMMERCIAL

## 2023-07-22 ENCOUNTER — HEALTH MAINTENANCE LETTER (OUTPATIENT)
Age: 48
End: 2023-07-22

## 2024-02-14 ENCOUNTER — LAB REQUISITION (OUTPATIENT)
Dept: LAB | Facility: CLINIC | Age: 49
End: 2024-02-14
Payer: COMMERCIAL

## 2024-02-14 DIAGNOSIS — L50.9 URTICARIA, UNSPECIFIED: ICD-10-CM

## 2024-02-14 LAB
Lab: NORMAL
PERFORMING LABORATORY: NORMAL
SPECIMEN STATUS: NORMAL
TEST NAME: NORMAL

## 2024-02-14 PROCEDURE — 86003 ALLG SPEC IGE CRUDE XTRC EA: CPT | Mod: ORL | Performed by: PHYSICIAN ASSISTANT

## 2024-02-14 PROCEDURE — 84999 UNLISTED CHEMISTRY PROCEDURE: CPT | Mod: ORL | Performed by: PHYSICIAN ASSISTANT

## 2024-02-15 LAB
ALMOND IGE QN: <0.1 KU(A)/L
CASHEW NUT IGE QN: <0.1 KU(A)/L
CODFISH IGE QN: <0.1 KU(A)/L
COW MILK IGE QN: <0.1 KU(A)/L
EGG WHITE IGE QN: <0.1 KU(A)/L
HAZELNUT IGE QN: <0.1 KU(A)/L
IGE SERPL-ACNC: 110 KU/L (ref 0–114)
MISCELLANEOUS TEST 1 (ARUP): NORMAL
PEANUT IGE QN: <0.1 KU(A)/L
SALMON IGE QN: <0.1 KU(A)/L
SCALLOP IGE QN: <0.1 KU(A)/L
SESAME SEED IGE QN: <0.1 KU(A)/L
SHRIMP IGE QN: <0.1 KU(A)/L
SOYBEAN IGE QN: <0.1 KU(A)/L
TUNA IGE QN: <0.1 KU(A)/L
WALNUT IGE QN: <0.1 KU(A)/L
WHEAT IGE QN: <0.1 KU(A)/L

## 2024-04-05 ENCOUNTER — LAB (OUTPATIENT)
Dept: LAB | Facility: HOSPITAL | Age: 49
End: 2024-04-05
Payer: COMMERCIAL

## 2024-04-05 DIAGNOSIS — Z83.49 11B-HYDROXYSTEROID DEHYDROGENASE DEFICIENCY, FAMILY MEMBER: Primary | ICD-10-CM

## 2024-04-05 DIAGNOSIS — E55.9 AVITAMINOSIS D: ICD-10-CM

## 2024-04-05 DIAGNOSIS — Z15.89 DEFICIENCY OF DNA REPAIR: ICD-10-CM

## 2024-04-05 DIAGNOSIS — E61.1 IRON DEFICIENCY: ICD-10-CM

## 2024-04-05 DIAGNOSIS — E78.00 PURE HYPERCHOLESTEROLEMIA: ICD-10-CM

## 2024-04-05 LAB
FOLATE SERPL-MCNC: 27.8 NG/ML (ref 4.6–34.8)
T4 FREE SERPL-MCNC: 1.25 NG/DL (ref 0.9–1.7)
TSH SERPL DL<=0.005 MIU/L-ACNC: 2.49 UIU/ML (ref 0.3–4.2)

## 2024-04-05 PROCEDURE — 82728 ASSAY OF FERRITIN: CPT

## 2024-04-05 PROCEDURE — 83525 ASSAY OF INSULIN: CPT

## 2024-04-05 PROCEDURE — 86376 MICROSOMAL ANTIBODY EACH: CPT

## 2024-04-05 PROCEDURE — 36415 COLL VENOUS BLD VENIPUNCTURE: CPT

## 2024-04-05 PROCEDURE — 84439 ASSAY OF FREE THYROXINE: CPT

## 2024-04-05 PROCEDURE — 82746 ASSAY OF FOLIC ACID SERUM: CPT

## 2024-04-05 PROCEDURE — 86800 THYROGLOBULIN ANTIBODY: CPT

## 2024-04-05 PROCEDURE — 82525 ASSAY OF COPPER: CPT

## 2024-04-05 PROCEDURE — 84443 ASSAY THYROID STIM HORMONE: CPT

## 2024-04-05 PROCEDURE — 84481 FREE ASSAY (FT-3): CPT

## 2024-04-05 PROCEDURE — 82306 VITAMIN D 25 HYDROXY: CPT

## 2024-04-05 PROCEDURE — 82607 VITAMIN B-12: CPT

## 2024-04-05 PROCEDURE — 84630 ASSAY OF ZINC: CPT

## 2024-04-05 PROCEDURE — 83090 ASSAY OF HOMOCYSTEINE: CPT

## 2024-04-05 PROCEDURE — 84252 ASSAY OF VITAMIN B-2: CPT

## 2024-04-06 LAB
FERRITIN SERPL-MCNC: 41 NG/ML (ref 6–175)
HCYS SERPL-SCNC: 8 UMOL/L (ref 0–15)
INSULIN SERPL-ACNC: 2.8 UU/ML (ref 2.6–24.9)
T3FREE SERPL-MCNC: 2.6 PG/ML (ref 2–4.4)
VIT B12 SERPL-MCNC: 1084 PG/ML (ref 232–1245)

## 2024-04-07 LAB
COPPER SERPL-MCNC: 86.9 UG/DL
ZINC SERPL-MCNC: 64.9 UG/DL

## 2024-04-08 LAB — THYROPEROXIDASE AB SERPL-ACNC: <10 IU/ML

## 2024-04-09 LAB — VIT B2 SERPL-MCNC: 13 MCG/L (ref 1–19)

## 2024-04-12 LAB — SCANNED LAB RESULT: NORMAL

## 2024-04-14 LAB
DEPRECATED CALCIDIOL+CALCIFEROL SERPL-MC: <41 UG/L (ref 20–75)
VITAMIN D2 SERPL-MCNC: <5 UG/L
VITAMIN D3 SERPL-MCNC: 36 UG/L

## 2024-08-22 ENCOUNTER — LAB REQUISITION (OUTPATIENT)
Dept: LAB | Facility: CLINIC | Age: 49
End: 2024-08-22
Payer: COMMERCIAL

## 2024-08-22 DIAGNOSIS — Z01.419 ENCOUNTER FOR GYNECOLOGICAL EXAMINATION (GENERAL) (ROUTINE) WITHOUT ABNORMAL FINDINGS: ICD-10-CM

## 2024-08-22 DIAGNOSIS — Z13.6 ENCOUNTER FOR SCREENING FOR CARDIOVASCULAR DISORDERS: ICD-10-CM

## 2024-08-22 LAB
CHOLEST SERPL-MCNC: 187 MG/DL
FASTING STATUS PATIENT QL REPORTED: ABNORMAL
HDLC SERPL-MCNC: 61 MG/DL
LDLC SERPL CALC-MCNC: 113 MG/DL
NONHDLC SERPL-MCNC: 126 MG/DL
TRIGL SERPL-MCNC: 67 MG/DL

## 2024-08-22 PROCEDURE — G0145 SCR C/V CYTO,THINLAYER,RESCR: HCPCS | Mod: ORL | Performed by: FAMILY MEDICINE

## 2024-08-22 PROCEDURE — 87624 HPV HI-RISK TYP POOLED RSLT: CPT | Mod: ORL | Performed by: FAMILY MEDICINE

## 2024-08-22 PROCEDURE — 80061 LIPID PANEL: CPT | Mod: ORL | Performed by: FAMILY MEDICINE

## 2024-08-23 LAB
HPV HR 12 DNA CVX QL NAA+PROBE: NEGATIVE
HPV16 DNA CVX QL NAA+PROBE: NEGATIVE
HPV18 DNA CVX QL NAA+PROBE: NEGATIVE
HUMAN PAPILLOMA VIRUS FINAL DIAGNOSIS: NORMAL

## 2024-08-28 LAB
BKR AP ASSOCIATED HPV REPORT: ABNORMAL
BKR LAB AP GYN ADEQUACY: ABNORMAL
BKR LAB AP GYN INTERPRETATION: ABNORMAL
BKR LAB AP GYN OTHER FINDINGS: ABNORMAL
BKR LAB AP LMP: ABNORMAL
BKR LAB AP PREVIOUS ABNL DX: ABNORMAL
BKR LAB AP PREVIOUS ABNORMAL: ABNORMAL
PATH REPORT.COMMENTS IMP SPEC: ABNORMAL
PATH REPORT.COMMENTS IMP SPEC: ABNORMAL
PATH REPORT.RELEVANT HX SPEC: ABNORMAL

## 2024-08-28 PROCEDURE — 88141 CYTOPATH C/V INTERPRET: CPT | Performed by: PATHOLOGY

## 2024-09-12 ENCOUNTER — ANCILLARY PROCEDURE (OUTPATIENT)
Dept: MAMMOGRAPHY | Facility: HOSPITAL | Age: 49
End: 2024-09-12
Attending: FAMILY MEDICINE
Payer: COMMERCIAL

## 2024-09-12 DIAGNOSIS — Z12.31 VISIT FOR SCREENING MAMMOGRAM: ICD-10-CM

## 2024-09-12 PROCEDURE — 77063 BREAST TOMOSYNTHESIS BI: CPT

## 2024-09-14 ENCOUNTER — HEALTH MAINTENANCE LETTER (OUTPATIENT)
Age: 49
End: 2024-09-14

## 2024-09-19 ENCOUNTER — ANCILLARY PROCEDURE (OUTPATIENT)
Dept: MAMMOGRAPHY | Facility: CLINIC | Age: 49
End: 2024-09-19
Attending: FAMILY MEDICINE
Payer: COMMERCIAL

## 2024-09-19 DIAGNOSIS — N64.89 BREAST ASYMMETRY: ICD-10-CM

## 2024-09-19 PROCEDURE — 76642 ULTRASOUND BREAST LIMITED: CPT | Mod: 50

## 2025-04-03 ENCOUNTER — LAB REQUISITION (OUTPATIENT)
Dept: LAB | Facility: CLINIC | Age: 50
End: 2025-04-03
Payer: COMMERCIAL

## 2025-04-03 DIAGNOSIS — Z13.6 ENCOUNTER FOR SCREENING FOR CARDIOVASCULAR DISORDERS: ICD-10-CM

## 2025-04-03 DIAGNOSIS — R53.83 OTHER FATIGUE: ICD-10-CM

## 2025-04-03 PROCEDURE — 84443 ASSAY THYROID STIM HORMONE: CPT | Mod: ORL | Performed by: FAMILY MEDICINE

## 2025-04-03 PROCEDURE — 80061 LIPID PANEL: CPT | Mod: ORL | Performed by: FAMILY MEDICINE

## 2025-04-04 LAB
CHOLEST SERPL-MCNC: 200 MG/DL
FASTING STATUS PATIENT QL REPORTED: ABNORMAL
HDLC SERPL-MCNC: 64 MG/DL
LDLC SERPL CALC-MCNC: 123 MG/DL
NONHDLC SERPL-MCNC: 136 MG/DL
TRIGL SERPL-MCNC: 66 MG/DL
TSH SERPL DL<=0.005 MIU/L-ACNC: 1.95 UIU/ML (ref 0.3–4.2)

## 2025-08-27 ENCOUNTER — OFFICE VISIT (OUTPATIENT)
Dept: URGENT CARE | Facility: URGENT CARE | Age: 50
End: 2025-08-27
Payer: COMMERCIAL

## 2025-08-27 VITALS
HEART RATE: 55 BPM | OXYGEN SATURATION: 100 % | DIASTOLIC BLOOD PRESSURE: 79 MMHG | RESPIRATION RATE: 20 BRPM | TEMPERATURE: 98.3 F | SYSTOLIC BLOOD PRESSURE: 115 MMHG

## 2025-08-27 DIAGNOSIS — N61.0 BREAST INFECTION IN FEMALE: Primary | ICD-10-CM

## 2025-08-27 PROCEDURE — 99214 OFFICE O/P EST MOD 30 MIN: CPT | Performed by: PHYSICIAN ASSISTANT

## 2025-08-27 PROCEDURE — 3078F DIAST BP <80 MM HG: CPT | Performed by: PHYSICIAN ASSISTANT

## 2025-08-27 PROCEDURE — 3074F SYST BP LT 130 MM HG: CPT | Performed by: PHYSICIAN ASSISTANT

## 2025-08-27 RX ORDER — CEPHALEXIN 500 MG/1
500 CAPSULE ORAL 4 TIMES DAILY
Qty: 40 CAPSULE | Refills: 0 | Status: SHIPPED | OUTPATIENT
Start: 2025-08-27 | End: 2025-09-06

## 2025-08-28 ENCOUNTER — ANCILLARY PROCEDURE (OUTPATIENT)
Dept: MAMMOGRAPHY | Facility: CLINIC | Age: 50
End: 2025-08-28
Attending: PHYSICIAN ASSISTANT
Payer: COMMERCIAL

## 2025-08-28 ENCOUNTER — PATIENT OUTREACH (OUTPATIENT)
Dept: ONCOLOGY | Facility: CLINIC | Age: 50
End: 2025-08-28
Payer: COMMERCIAL

## 2025-08-28 DIAGNOSIS — N61.0 BREAST INFECTION IN FEMALE: ICD-10-CM

## 2025-08-28 PROCEDURE — 76642 ULTRASOUND BREAST LIMITED: CPT | Mod: RT
